# Patient Record
Sex: MALE | Race: BLACK OR AFRICAN AMERICAN | Employment: FULL TIME | URBAN - METROPOLITAN AREA
[De-identification: names, ages, dates, MRNs, and addresses within clinical notes are randomized per-mention and may not be internally consistent; named-entity substitution may affect disease eponyms.]

---

## 2017-05-20 PROBLEM — R73.9 HYPERGLYCEMIA: Status: ACTIVE | Noted: 2017-05-20

## 2018-06-12 ENCOUNTER — HOSPITAL ENCOUNTER (EMERGENCY)
Age: 53
Discharge: HOME | End: 2018-06-12
Attending: EMERGENCY MEDICINE

## 2018-06-12 VITALS
HEIGHT: 68 IN | WEIGHT: 235 LBS | OXYGEN SATURATION: 97 % | SYSTOLIC BLOOD PRESSURE: 136 MMHG | TEMPERATURE: 97.9 F | HEART RATE: 90 BPM | RESPIRATION RATE: 14 BRPM | DIASTOLIC BLOOD PRESSURE: 88 MMHG | BODY MASS INDEX: 35.61 KG/M2

## 2018-06-12 DIAGNOSIS — Z91.199 NONCOMPLIANCE: ICD-10-CM

## 2018-06-12 LAB
ALBUMIN SERPL-MCNC: 4.3 G/DL (ref 3.5–5.2)
ALP BLD-CCNC: 107 U/L (ref 40–129)
ALT SERPL-CCNC: 33 U/L (ref 0–40)
ANION GAP SERPL CALCULATED.3IONS-SCNC: 14 MMOL/L (ref 7–16)
ANION GAP SERPL CALCULATED.3IONS-SCNC: 21 MMOL/L (ref 7–16)
AST SERPL-CCNC: 22 U/L (ref 0–39)
BACTERIA: ABNORMAL /HPF
BASOPHILS ABSOLUTE: 0.02 E9/L (ref 0–0.2)
BASOPHILS RELATIVE PERCENT: 0.2 % (ref 0–2)
BETA-HYDROXYBUTYRATE: >4.5 MMOL/L (ref 0.02–0.27)
BILIRUB SERPL-MCNC: 0.3 MG/DL (ref 0–1.2)
BILIRUBIN URINE: NEGATIVE
BLOOD, URINE: ABNORMAL
BUN BLDV-MCNC: 20 MG/DL (ref 6–20)
BUN BLDV-MCNC: 23 MG/DL (ref 6–20)
CALCIUM SERPL-MCNC: 10 MG/DL (ref 8.6–10.2)
CALCIUM SERPL-MCNC: 8.7 MG/DL (ref 8.6–10.2)
CHLORIDE BLD-SCNC: 86 MMOL/L (ref 98–107)
CHLORIDE BLD-SCNC: 97 MMOL/L (ref 98–107)
CHP ED QC CHECK: NORMAL
CLARITY: CLEAR
CO2: 21 MMOL/L (ref 22–29)
CO2: 24 MMOL/L (ref 22–29)
COLOR: YELLOW
CREAT SERPL-MCNC: 1.3 MG/DL (ref 0.7–1.2)
CREAT SERPL-MCNC: 1.3 MG/DL (ref 0.7–1.2)
EKG ATRIAL RATE: 99 BPM
EKG P AXIS: 36 DEGREES
EKG P-R INTERVAL: 146 MS
EKG Q-T INTERVAL: 338 MS
EKG QRS DURATION: 94 MS
EKG QTC CALCULATION (BAZETT): 433 MS
EKG R AXIS: 45 DEGREES
EKG T AXIS: 7 DEGREES
EKG VENTRICULAR RATE: 99 BPM
EOSINOPHILS ABSOLUTE: 0.06 E9/L (ref 0.05–0.5)
EOSINOPHILS RELATIVE PERCENT: 0.6 % (ref 0–6)
GFR AFRICAN AMERICAN: >60
GFR AFRICAN AMERICAN: >60
GFR NON-AFRICAN AMERICAN: >60 ML/MIN/1.73
GFR NON-AFRICAN AMERICAN: >60 ML/MIN/1.73
GLUCOSE BLD-MCNC: 332 MG/DL (ref 74–109)
GLUCOSE BLD-MCNC: 481 MG/DL
GLUCOSE BLD-MCNC: 519 MG/DL (ref 74–109)
GLUCOSE URINE: >=1000 MG/DL
HCT VFR BLD CALC: 45.7 % (ref 37–54)
HEMOGLOBIN: 15.3 G/DL (ref 12.5–16.5)
IMMATURE GRANULOCYTES #: 0.05 E9/L
IMMATURE GRANULOCYTES %: 0.5 % (ref 0–5)
KETONES, URINE: >=80 MG/DL
LACTIC ACID: 1.7 MMOL/L (ref 0.5–2.2)
LEUKOCYTE ESTERASE, URINE: NEGATIVE
LYMPHOCYTES ABSOLUTE: 2.14 E9/L (ref 1.5–4)
LYMPHOCYTES RELATIVE PERCENT: 23 % (ref 20–42)
MCH RBC QN AUTO: 26.7 PG (ref 26–35)
MCHC RBC AUTO-ENTMCNC: 33.5 % (ref 32–34.5)
MCV RBC AUTO: 79.6 FL (ref 80–99.9)
METER GLUCOSE: 349 MG/DL (ref 70–110)
METER GLUCOSE: 481 MG/DL (ref 70–110)
MONOCYTES ABSOLUTE: 0.56 E9/L (ref 0.1–0.95)
MONOCYTES RELATIVE PERCENT: 6 % (ref 2–12)
NEUTROPHILS ABSOLUTE: 6.48 E9/L (ref 1.8–7.3)
NEUTROPHILS RELATIVE PERCENT: 69.7 % (ref 43–80)
NITRITE, URINE: NEGATIVE
PDW BLD-RTO: 12.8 FL (ref 11.5–15)
PH UA: 5 (ref 5–9)
PH VENOUS: 7.38 (ref 7.3–7.42)
PLATELET # BLD: 236 E9/L (ref 130–450)
PMV BLD AUTO: 10.6 FL (ref 7–12)
POTASSIUM SERPL-SCNC: 4.5 MMOL/L (ref 3.5–5)
POTASSIUM SERPL-SCNC: 5.3 MMOL/L (ref 3.5–5)
PROTEIN UA: ABNORMAL MG/DL
RBC # BLD: 5.74 E12/L (ref 3.8–5.8)
RBC UA: ABNORMAL /HPF (ref 0–2)
SODIUM BLD-SCNC: 128 MMOL/L (ref 132–146)
SODIUM BLD-SCNC: 135 MMOL/L (ref 132–146)
SPECIFIC GRAVITY UA: 1.01 (ref 1–1.03)
TOTAL PROTEIN: 8.6 G/DL (ref 6.4–8.3)
UROBILINOGEN, URINE: 0.2 E.U./DL
WBC # BLD: 9.3 E9/L (ref 4.5–11.5)
WBC UA: ABNORMAL /HPF (ref 0–5)

## 2018-06-12 PROCEDURE — 99285 EMERGENCY DEPT VISIT HI MDM: CPT

## 2018-06-12 PROCEDURE — 80048 BASIC METABOLIC PNL TOTAL CA: CPT

## 2018-06-12 PROCEDURE — 80053 COMPREHEN METABOLIC PANEL: CPT

## 2018-06-12 PROCEDURE — 82800 BLOOD PH: CPT

## 2018-06-12 PROCEDURE — 96361 HYDRATE IV INFUSION ADD-ON: CPT

## 2018-06-12 PROCEDURE — 81001 URINALYSIS AUTO W/SCOPE: CPT

## 2018-06-12 PROCEDURE — 96372 THER/PROPH/DIAG INJ SC/IM: CPT

## 2018-06-12 PROCEDURE — 93005 ELECTROCARDIOGRAM TRACING: CPT | Performed by: STUDENT IN AN ORGANIZED HEALTH CARE EDUCATION/TRAINING PROGRAM

## 2018-06-12 PROCEDURE — 36415 COLL VENOUS BLD VENIPUNCTURE: CPT

## 2018-06-12 PROCEDURE — 82010 KETONE BODYS QUAN: CPT

## 2018-06-12 PROCEDURE — 96360 HYDRATION IV INFUSION INIT: CPT

## 2018-06-12 PROCEDURE — 2580000003 HC RX 258: Performed by: STUDENT IN AN ORGANIZED HEALTH CARE EDUCATION/TRAINING PROGRAM

## 2018-06-12 PROCEDURE — 85025 COMPLETE CBC W/AUTO DIFF WBC: CPT

## 2018-06-12 PROCEDURE — 82962 GLUCOSE BLOOD TEST: CPT

## 2018-06-12 PROCEDURE — 6370000000 HC RX 637 (ALT 250 FOR IP): Performed by: STUDENT IN AN ORGANIZED HEALTH CARE EDUCATION/TRAINING PROGRAM

## 2018-06-12 PROCEDURE — 83605 ASSAY OF LACTIC ACID: CPT

## 2018-06-12 RX ORDER — 0.9 % SODIUM CHLORIDE 0.9 %
1000 INTRAVENOUS SOLUTION INTRAVENOUS ONCE
Status: COMPLETED | OUTPATIENT
Start: 2018-06-12 | End: 2018-06-12

## 2018-06-12 RX ORDER — INSULIN GLARGINE 100 [IU]/ML
30 INJECTION, SOLUTION SUBCUTANEOUS DAILY
Qty: 1 VIAL | Refills: 0 | Status: SHIPPED | OUTPATIENT
Start: 2018-06-12 | End: 2018-06-19 | Stop reason: SDUPTHER

## 2018-06-12 RX ORDER — LISINOPRIL AND HYDROCHLOROTHIAZIDE 25; 20 MG/1; MG/1
1 TABLET ORAL DAILY
COMMUNITY
End: 2018-06-12 | Stop reason: SDUPTHER

## 2018-06-12 RX ORDER — LISINOPRIL AND HYDROCHLOROTHIAZIDE 25; 20 MG/1; MG/1
1 TABLET ORAL DAILY
Qty: 30 TABLET | Refills: 0 | Status: SHIPPED | OUTPATIENT
Start: 2018-06-12 | End: 2018-06-19 | Stop reason: SDUPTHER

## 2018-06-12 RX ADMIN — INSULIN HUMAN 5 UNITS: 100 INJECTION, SOLUTION PARENTERAL at 10:02

## 2018-06-12 RX ADMIN — SODIUM CHLORIDE 1000 ML: 9 INJECTION, SOLUTION INTRAVENOUS at 09:08

## 2018-06-12 RX ADMIN — SODIUM CHLORIDE 1000 ML: 9 INJECTION, SOLUTION INTRAVENOUS at 11:37

## 2018-06-12 ASSESSMENT — ENCOUNTER SYMPTOMS
DIARRHEA: 0
BLOOD IN STOOL: 0
BACK PAIN: 0
SHORTNESS OF BREATH: 0
SORE THROAT: 0
CHEST TIGHTNESS: 0
NAUSEA: 0
CONSTIPATION: 0
ABDOMINAL PAIN: 0
COLOR CHANGE: 0
COUGH: 0

## 2018-06-19 ENCOUNTER — OFFICE VISIT (OUTPATIENT)
Dept: INTERNAL MEDICINE | Age: 53
End: 2018-06-19

## 2018-06-19 VITALS
WEIGHT: 210.3 LBS | DIASTOLIC BLOOD PRESSURE: 80 MMHG | HEART RATE: 112 BPM | SYSTOLIC BLOOD PRESSURE: 130 MMHG | BODY MASS INDEX: 31.87 KG/M2 | TEMPERATURE: 97.4 F | RESPIRATION RATE: 18 BRPM | HEIGHT: 68 IN

## 2018-06-19 DIAGNOSIS — Z79.4 TYPE 2 DIABETES MELLITUS WITHOUT COMPLICATION, WITH LONG-TERM CURRENT USE OF INSULIN (HCC): Primary | ICD-10-CM

## 2018-06-19 DIAGNOSIS — E11.9 TYPE 2 DIABETES MELLITUS WITHOUT COMPLICATION, WITH LONG-TERM CURRENT USE OF INSULIN (HCC): Primary | ICD-10-CM

## 2018-06-19 LAB — HBA1C MFR BLD: NORMAL %

## 2018-06-19 PROCEDURE — 99213 OFFICE O/P EST LOW 20 MIN: CPT | Performed by: INTERNAL MEDICINE

## 2018-06-19 PROCEDURE — 83036 HEMOGLOBIN GLYCOSYLATED A1C: CPT | Performed by: INTERNAL MEDICINE

## 2018-06-19 RX ORDER — LANCETS 33 GAUGE
EACH MISCELLANEOUS
Qty: 100 EACH | Refills: 5 | Status: SHIPPED | OUTPATIENT
Start: 2018-06-19 | End: 2018-06-20 | Stop reason: CLARIF

## 2018-06-19 RX ORDER — BLOOD-GLUCOSE METER
EACH MISCELLANEOUS
Qty: 1 DEVICE | Refills: 0 | Status: SHIPPED | OUTPATIENT
Start: 2018-06-19 | End: 2018-06-20 | Stop reason: CLARIF

## 2018-06-19 RX ORDER — LISINOPRIL AND HYDROCHLOROTHIAZIDE 25; 20 MG/1; MG/1
1 TABLET ORAL DAILY
Qty: 90 TABLET | Refills: 1 | Status: SHIPPED | OUTPATIENT
Start: 2018-06-19

## 2018-06-19 RX ORDER — INSULIN GLARGINE 100 [IU]/ML
30 INJECTION, SOLUTION SUBCUTANEOUS DAILY
Qty: 1 VIAL | Refills: 3 | Status: SHIPPED
Start: 2018-06-19 | End: 2021-06-08 | Stop reason: ALTCHOICE

## 2018-06-19 RX ORDER — ATORVASTATIN CALCIUM 10 MG/1
10 TABLET, FILM COATED ORAL DAILY
Qty: 90 TABLET | Refills: 1 | Status: SHIPPED
Start: 2018-06-19 | End: 2021-06-08

## 2018-06-19 ASSESSMENT — ENCOUNTER SYMPTOMS
VOMITING: 0
SHORTNESS OF BREATH: 0
NAUSEA: 0
BACK PAIN: 0
ABDOMINAL PAIN: 0
DIARRHEA: 0
WHEEZING: 0
SORE THROAT: 0

## 2018-06-19 ASSESSMENT — PATIENT HEALTH QUESTIONNAIRE - PHQ9
1. LITTLE INTEREST OR PLEASURE IN DOING THINGS: 0
SUM OF ALL RESPONSES TO PHQ QUESTIONS 1-9: 0
SUM OF ALL RESPONSES TO PHQ9 QUESTIONS 1 & 2: 0
2. FEELING DOWN, DEPRESSED OR HOPELESS: 0

## 2018-06-20 RX ORDER — BLOOD-GLUCOSE METER
EACH MISCELLANEOUS
Qty: 1 DEVICE | Refills: 0 | Status: SHIPPED
Start: 2018-06-20 | End: 2021-06-08 | Stop reason: ALTCHOICE

## 2018-06-20 RX ORDER — LANCETS 33 GAUGE
EACH MISCELLANEOUS
Qty: 100 EACH | Refills: 5 | Status: SHIPPED
Start: 2018-06-20 | End: 2021-06-08 | Stop reason: ALTCHOICE

## 2018-07-10 ENCOUNTER — HOSPITAL ENCOUNTER (OUTPATIENT)
Dept: DIABETES SERVICES | Age: 53
Setting detail: THERAPIES SERIES
Discharge: HOME | End: 2018-07-10

## 2018-07-10 PROCEDURE — G0108 DIAB MANAGE TRN  PER INDIV: HCPCS

## 2018-07-10 PROCEDURE — G0109 DIAB MANAGE TRN IND/GROUP: HCPCS

## 2018-07-10 ASSESSMENT — PATIENT HEALTH QUESTIONNAIRE - PHQ9
2. FEELING DOWN, DEPRESSED OR HOPELESS: 0
7. TROUBLE CONCENTRATING ON THINGS, SUCH AS READING THE NEWSPAPER OR WATCHING TELEVISION: 0
8. MOVING OR SPEAKING SO SLOWLY THAT OTHER PEOPLE COULD HAVE NOTICED. OR THE OPPOSITE, BEING SO FIGETY OR RESTLESS THAT YOU HAVE BEEN MOVING AROUND A LOT MORE THAN USUAL: 0
9. THOUGHTS THAT YOU WOULD BE BETTER OFF DEAD, OR OF HURTING YOURSELF: 0
3. TROUBLE FALLING OR STAYING ASLEEP: 1
4. FEELING TIRED OR HAVING LITTLE ENERGY: 1
5. POOR APPETITE OR OVEREATING: 0
10. IF YOU CHECKED OFF ANY PROBLEMS, HOW DIFFICULT HAVE THESE PROBLEMS MADE IT FOR YOU TO DO YOUR WORK, TAKE CARE OF THINGS AT HOME, OR GET ALONG WITH OTHER PEOPLE: 0
SUM OF ALL RESPONSES TO PHQ9 QUESTIONS 1 & 2: 0
SUM OF ALL RESPONSES TO PHQ QUESTIONS 1-9: 2
6. FEELING BAD ABOUT YOURSELF - OR THAT YOU ARE A FAILURE OR HAVE LET YOURSELF OR YOUR FAMILY DOWN: 0
1. LITTLE INTEREST OR PLEASURE IN DOING THINGS: 0

## 2018-07-10 NOTE — LETTER
[facility])- Diabetes Education    7/10/2018       Re:     Keith Huffman         :  1965  Dear Dr. Carly Enciso: Thank you for referring your patient, Keith Huffman, for diabetes education sessions. Your patient attended classes the week of 7/10/18, that addressed the following topics:    Nursing/Medical [x]     Nutrition [x]  · Describing the diabetes disease process/ treatment options  · Incorporating physical activity into lifestyle  · Using medication safely & for maximum therapeutic effectiveness  · Monitoring blood glucose & other parameters:  Interpreting and using results for self-management & decision making  · Preventing, detecting, & treating acute complications  · Preventing, detecting & treating chronic complications  · Developing personal strategies to address psychosocial issues and concerns  · Developing personal strategies to promote health & behavior change (risk reduction) · Incorporating nutrition management into lifestyle    · Nutrition for diabetes prevention & diabetes  · Relationship among nutrition, exercise, medication & blood glucose levels  · Food Groups/carbohydrate & non- carbohydrate foods  · Whole grain/high fiber foods & needs  · Fluid needs   · Label reading  · Carbohydrate consistent meal planning/ techniques  · Weighing/measuring portions  · Heart healthy guidelines: fat, sodium & cholesterol  · Alcohol  · Free foods/nutritive and non-nutritive sweeteners  · Dining out tips and recipe modification guidelines  · Sick day guidelines     [x]  Instructed on carbohydrate consistent meal plan with  1900 calories, and the following number of carbohydrate servings per meal or snack (15 gm/serving):   Breakfast: 5  Lunch: 3  Dinner: 5    [x]  Did well with return demonstration on an Agamatrix Presto glucose meter. Blood glucose was 465 mg/dl. []  Did well with return demonstration of insulin technique with        .

## 2018-07-11 ENCOUNTER — HOSPITAL ENCOUNTER (OUTPATIENT)
Dept: DIABETES SERVICES | Age: 53
Setting detail: THERAPIES SERIES
Discharge: HOME | End: 2018-07-11

## 2018-07-11 PROCEDURE — G0109 DIAB MANAGE TRN IND/GROUP: HCPCS

## 2018-07-12 ENCOUNTER — HOSPITAL ENCOUNTER (OUTPATIENT)
Dept: DIABETES SERVICES | Age: 53
Setting detail: THERAPIES SERIES
Discharge: HOME | End: 2018-07-12

## 2018-07-12 PROCEDURE — 97804 MEDICAL NUTRITION GROUP: CPT

## 2018-10-31 ENCOUNTER — TELEPHONE (OUTPATIENT)
Dept: INTERNAL MEDICINE | Age: 53
End: 2018-10-31

## 2019-04-24 ENCOUNTER — TELEPHONE (OUTPATIENT)
Dept: INTERNAL MEDICINE | Age: 54
End: 2019-04-24

## 2019-04-24 NOTE — TELEPHONE ENCOUNTER
Reached out to patient due to being on the non-adherent list and to schedule an appt. Patient has not been seen in the clinic since June 2018. Unable to reached patient, left a voicemail.

## 2019-05-07 ENCOUNTER — HOSPITAL ENCOUNTER (EMERGENCY)
Age: 54
Discharge: HOME | End: 2019-05-07
Attending: EMERGENCY MEDICINE

## 2019-05-07 VITALS
SYSTOLIC BLOOD PRESSURE: 105 MMHG | DIASTOLIC BLOOD PRESSURE: 70 MMHG | OXYGEN SATURATION: 99 % | HEIGHT: 68 IN | HEART RATE: 96 BPM | TEMPERATURE: 96.8 F | RESPIRATION RATE: 16 BRPM | WEIGHT: 197 LBS | BODY MASS INDEX: 29.86 KG/M2

## 2019-05-07 DIAGNOSIS — E11.8 TYPE 2 DIABETES MELLITUS WITH COMPLICATION, UNSPECIFIED WHETHER LONG TERM INSULIN USE: Primary | ICD-10-CM

## 2019-05-07 DIAGNOSIS — E11.42 DIABETIC POLYNEUROPATHY ASSOCIATED WITH TYPE 2 DIABETES MELLITUS (HCC): ICD-10-CM

## 2019-05-07 LAB
ALBUMIN SERPL-MCNC: 3.9 G/DL (ref 3.5–5.2)
ALP BLD-CCNC: 120 U/L (ref 40–129)
ALT SERPL-CCNC: 12 U/L (ref 0–40)
ANION GAP SERPL CALCULATED.3IONS-SCNC: 12 MMOL/L (ref 7–16)
AST SERPL-CCNC: 26 U/L (ref 0–39)
BASOPHILS ABSOLUTE: 0.02 E9/L (ref 0–0.2)
BASOPHILS RELATIVE PERCENT: 0.3 % (ref 0–2)
BETA-HYDROXYBUTYRATE: 0.87 MMOL/L (ref 0.02–0.27)
BILIRUB SERPL-MCNC: 0.2 MG/DL (ref 0–1.2)
BILIRUBIN URINE: NEGATIVE
BLOOD, URINE: NEGATIVE
BUN BLDV-MCNC: 38 MG/DL (ref 6–20)
CALCIUM SERPL-MCNC: 9.7 MG/DL (ref 8.6–10.2)
CHLORIDE BLD-SCNC: 89 MMOL/L (ref 98–107)
CHP ED QC CHECK: YES
CLARITY: CLEAR
CO2: 25 MMOL/L (ref 22–29)
COLOR: YELLOW
CREAT SERPL-MCNC: 1.7 MG/DL (ref 0.7–1.2)
EKG ATRIAL RATE: 100 BPM
EKG P AXIS: 59 DEGREES
EKG P-R INTERVAL: 154 MS
EKG Q-T INTERVAL: 342 MS
EKG QRS DURATION: 98 MS
EKG QTC CALCULATION (BAZETT): 441 MS
EKG R AXIS: 82 DEGREES
EKG T AXIS: 39 DEGREES
EKG VENTRICULAR RATE: 100 BPM
EOSINOPHILS ABSOLUTE: 0.11 E9/L (ref 0.05–0.5)
EOSINOPHILS RELATIVE PERCENT: 1.5 % (ref 0–6)
GFR AFRICAN AMERICAN: 51
GFR NON-AFRICAN AMERICAN: 51 ML/MIN/1.73
GLUCOSE BLD-MCNC: 364 MG/DL
GLUCOSE BLD-MCNC: 528 MG/DL (ref 74–99)
GLUCOSE URINE: >=1000 MG/DL
HCT VFR BLD CALC: 40.4 % (ref 37–54)
HEMOGLOBIN: 13.8 G/DL (ref 12.5–16.5)
IMMATURE GRANULOCYTES #: 0.02 E9/L
IMMATURE GRANULOCYTES %: 0.3 % (ref 0–5)
KETONES, URINE: ABNORMAL MG/DL
LACTIC ACID: 1.1 MMOL/L (ref 0.5–2.2)
LEUKOCYTE ESTERASE, URINE: NEGATIVE
LYMPHOCYTES ABSOLUTE: 2.38 E9/L (ref 1.5–4)
LYMPHOCYTES RELATIVE PERCENT: 31.7 % (ref 20–42)
MCH RBC QN AUTO: 26.3 PG (ref 26–35)
MCHC RBC AUTO-ENTMCNC: 34.2 % (ref 32–34.5)
MCV RBC AUTO: 77.1 FL (ref 80–99.9)
METER GLUCOSE: 364 MG/DL (ref 74–99)
METER GLUCOSE: 480 MG/DL (ref 74–99)
MONOCYTES ABSOLUTE: 0.59 E9/L (ref 0.1–0.95)
MONOCYTES RELATIVE PERCENT: 7.9 % (ref 2–12)
NEUTROPHILS ABSOLUTE: 4.39 E9/L (ref 1.8–7.3)
NEUTROPHILS RELATIVE PERCENT: 58.3 % (ref 43–80)
NITRITE, URINE: NEGATIVE
PDW BLD-RTO: 12.6 FL (ref 11.5–15)
PH UA: 5.5 (ref 5–9)
PH VENOUS: 7.39 (ref 7.3–7.42)
PLATELET # BLD: 220 E9/L (ref 130–450)
PMV BLD AUTO: 10.1 FL (ref 7–12)
POTASSIUM SERPL-SCNC: 5 MMOL/L (ref 3.5–5)
PROTEIN UA: NEGATIVE MG/DL
RBC # BLD: 5.24 E12/L (ref 3.8–5.8)
SODIUM BLD-SCNC: 126 MMOL/L (ref 132–146)
SPECIFIC GRAVITY UA: 1.01 (ref 1–1.03)
TOTAL PROTEIN: 8 G/DL (ref 6.4–8.3)
TROPONIN: <0.01 NG/ML (ref 0–0.03)
UROBILINOGEN, URINE: 0.2 E.U./DL
WBC # BLD: 7.5 E9/L (ref 4.5–11.5)

## 2019-05-07 PROCEDURE — 99284 EMERGENCY DEPT VISIT MOD MDM: CPT

## 2019-05-07 PROCEDURE — 96372 THER/PROPH/DIAG INJ SC/IM: CPT

## 2019-05-07 PROCEDURE — 83605 ASSAY OF LACTIC ACID: CPT

## 2019-05-07 PROCEDURE — 82800 BLOOD PH: CPT

## 2019-05-07 PROCEDURE — 36415 COLL VENOUS BLD VENIPUNCTURE: CPT

## 2019-05-07 PROCEDURE — 81003 URINALYSIS AUTO W/O SCOPE: CPT

## 2019-05-07 PROCEDURE — 93005 ELECTROCARDIOGRAM TRACING: CPT | Performed by: PHYSICIAN ASSISTANT

## 2019-05-07 PROCEDURE — 85025 COMPLETE CBC W/AUTO DIFF WBC: CPT

## 2019-05-07 PROCEDURE — 82962 GLUCOSE BLOOD TEST: CPT

## 2019-05-07 PROCEDURE — 80053 COMPREHEN METABOLIC PANEL: CPT

## 2019-05-07 PROCEDURE — 93010 ELECTROCARDIOGRAM REPORT: CPT | Performed by: INTERNAL MEDICINE

## 2019-05-07 PROCEDURE — 84484 ASSAY OF TROPONIN QUANT: CPT

## 2019-05-07 PROCEDURE — 82010 KETONE BODYS QUAN: CPT

## 2019-05-07 PROCEDURE — 6370000000 HC RX 637 (ALT 250 FOR IP): Performed by: EMERGENCY MEDICINE

## 2019-05-07 PROCEDURE — 2580000003 HC RX 258: Performed by: PHYSICIAN ASSISTANT

## 2019-05-07 RX ORDER — 0.9 % SODIUM CHLORIDE 0.9 %
1000 INTRAVENOUS SOLUTION INTRAVENOUS ONCE
Status: COMPLETED | OUTPATIENT
Start: 2019-05-07 | End: 2019-05-07

## 2019-05-07 RX ADMIN — SODIUM CHLORIDE 1000 ML: 9 INJECTION, SOLUTION INTRAVENOUS at 10:50

## 2019-05-07 RX ADMIN — INSULIN HUMAN 10 UNITS: 100 INJECTION, SOLUTION PARENTERAL at 10:54

## 2019-05-07 RX ADMIN — SODIUM CHLORIDE 1000 ML: 9 INJECTION, SOLUTION INTRAVENOUS at 08:30

## 2019-05-07 NOTE — ED PROVIDER NOTES
ROM, non tender to palpation in the midline, no stridor, no crepitus, no meningeal signs  · Respiratory: Lungs clear to auscultation bilaterally, no wheezes, rales, or rhonchi. Not in respiratory distress  · CV:  Regular rate. Regular rhythm. No murmurs, gallops, or rubs. 2+ distal pulses  · GI:  Abdomen Soft, Non tender, Non distended. +BS. No rebound, guarding, or rigidity. No pulsatile masses. · Musculoskeletal: Moves all extremities x 4. Warm and well perfused, no clubbing, cyanosis, or edema. Capillary refill <3 seconds  · Integument: skin warm and dry. No rashes.    · Lymphatic: no lymphadenopathy noted  · Neurologic: GCS 15, no focal deficits, symmetric strength 5/5 in the upper and lower extremities bilaterally  · Psychiatric: Normal Affect      Lab / Imaging Results   (All laboratory and radiology results have been personally reviewed by myself)  Labs:  Results for orders placed or performed during the hospital encounter of 05/07/19   CBC Auto Differential   Result Value Ref Range    WBC 7.5 4.5 - 11.5 E9/L    RBC 5.24 3.80 - 5.80 E12/L    Hemoglobin 13.8 12.5 - 16.5 g/dL    Hematocrit 40.4 37.0 - 54.0 %    MCV 77.1 (L) 80.0 - 99.9 fL    MCH 26.3 26.0 - 35.0 pg    MCHC 34.2 32.0 - 34.5 %    RDW 12.6 11.5 - 15.0 fL    Platelets 444 850 - 747 E9/L    MPV 10.1 7.0 - 12.0 fL    Neutrophils % 58.3 43.0 - 80.0 %    Immature Granulocytes % 0.3 0.0 - 5.0 %    Lymphocytes % 31.7 20.0 - 42.0 %    Monocytes % 7.9 2.0 - 12.0 %    Eosinophils % 1.5 0.0 - 6.0 %    Basophils % 0.3 0.0 - 2.0 %    Neutrophils # 4.39 1.80 - 7.30 E9/L    Immature Granulocytes # 0.02 E9/L    Lymphocytes # 2.38 1.50 - 4.00 E9/L    Monocytes # 0.59 0.10 - 0.95 E9/L    Eosinophils # 0.11 0.05 - 0.50 E9/L    Basophils # 0.02 0.00 - 0.20 E9/L   Comprehensive Metabolic Panel   Result Value Ref Range    Sodium 126 (L) 132 - 146 mmol/L    Potassium 5.0 3.5 - 5.0 mmol/L    Chloride 89 (L) 98 - 107 mmol/L    CO2 25 22 - 29 mmol/L    Anion Gap 12 7 - 16 mmol/L    Glucose 528 (HH) 74 - 99 mg/dL    BUN 38 (H) 6 - 20 mg/dL    CREATININE 1.7 (H) 0.7 - 1.2 mg/dL    GFR Non-African American 51 >=60 mL/min/1.73    GFR African American 51     Calcium 9.7 8.6 - 10.2 mg/dL    Total Protein 8.0 6.4 - 8.3 g/dL    Alb 3.9 3.5 - 5.2 g/dL    Total Bilirubin 0.2 0.0 - 1.2 mg/dL    Alkaline Phosphatase 120 40 - 129 U/L    ALT 12 0 - 40 U/L    AST 26 0 - 39 U/L   Lactic Acid, Plasma   Result Value Ref Range    Lactic Acid 1.1 0.5 - 2.2 mmol/L   Urinalysis   Result Value Ref Range    Color, UA Yellow Straw/Yellow    Clarity, UA Clear Clear    Glucose, Ur >=1000 (A) Negative mg/dL    Bilirubin Urine Negative Negative    Ketones, Urine TRACE (A) Negative mg/dL    Specific Gravity, UA 1.010 1.005 - 1.030    Blood, Urine Negative Negative    pH, UA 5.5 5.0 - 9.0    Protein, UA Negative Negative mg/dL    Urobilinogen, Urine 0.2 <2.0 E.U./dL    Nitrite, Urine Negative Negative    Leukocyte Esterase, Urine Negative Negative   Beta-Hydroxybutyrate   Result Value Ref Range    Beta-Hydroxybutyrate 0.87 (H) 0.02 - 0.27 mmol/L   pH, venous   Result Value Ref Range    pH, Darwin 7.39 7.30 - 7.42   Troponin   Result Value Ref Range    Troponin <0.01 0.00 - 0.03 ng/mL   POCT Glucose   Result Value Ref Range    Meter Glucose 480 (H) 74 - 99 mg/dL   POCT Glucose   Result Value Ref Range    Glucose 364 mg/dL    QC OK? yes    POCT Glucose   Result Value Ref Range    Meter Glucose 364 (H) 74 - 99 mg/dL   EKG 12 Lead   Result Value Ref Range    Ventricular Rate 100 BPM    Atrial Rate 100 BPM    P-R Interval 154 ms    QRS Duration 98 ms    Q-T Interval 342 ms    QTc Calculation (Bazett) 441 ms    P Axis 59 degrees    R Axis 82 degrees    T Axis 39 degrees     Imaging: All Radiology results interpreted by Radiologist unless otherwise noted. No orders to display     EKG #1:  Interpreted by emergency department physician unless otherwise noted. Time:  0830    Rate: 100  Rhythm: Sinus.   Interpretation: normal sinus rhythm, nonspecific ST and T waves changes, unchanged from previous tracings. ED Course / Medical Decision Making     Medications   0.9 % sodium chloride bolus (0 mLs Intravenous Stopped 5/7/19 1038)   0.9 % sodium chloride bolus (0 mLs Intravenous Stopped 5/7/19 1205)   insulin regular (HUMULIN R;NOVOLIN R) injection 10 Units (10 Units Subcutaneous Given 5/7/19 1054)     Consultations:             None    Procedures:   none    MDM:  Patient in no acute distress. Vital signs stable. Initial glucose 480. Lab work obtained. Hydrate with IV fluid. Medicated with subcu insulin. Blood sugar improved. No signs of DKA. Troponin negative. EKG shows no acute changes. No focal neurologic deficits. Some decrease in sensation to the tips of the fingers. Dr. Magnus Mendez evaluated the patient. Patient will be started on metformin. Patient will follow with the internal medicine clinic. Educated on the signs and symptoms to return to the ED. Discharged in stable condition. Counseling:   I have spoken with the patient and discussed todays results, in addition to providing specific details for the plan of care and counseling regarding the diagnosis and prognosis and are agreeable with the plan. Assessment      1. Type 2 diabetes mellitus with complication, unspecified whether long term insulin use ([facility] [address]    2. Diabetic polyneuropathy associated with type 2 diabetes mellitus ([facility] [address]      This patient's ED course included: a personal history and physicial examination  This patient has remained hemodynamically stable during their ED course. Plan   Discharge to home. Patient condition is good.     New Medications     Discharge Medication List as of 5/7/2019 10:52 AM      START taking these medications    Details   metFORMIN (GLUCOPHAGE) 1000 MG tablet Take 1 tablet by mouth 2 times daily (with meals) for 14 days, Disp-28 tablet, R-0Print           Electronically signed by BHARATI Montano   DD: 5/7/19  **This

## 2021-06-08 ENCOUNTER — APPOINTMENT (OUTPATIENT)
Dept: CT IMAGING | Age: 56
DRG: 641 | End: 2021-06-08

## 2021-06-08 ENCOUNTER — HOSPITAL ENCOUNTER (INPATIENT)
Age: 56
LOS: 1 days | Discharge: HOME | DRG: 641 | End: 2021-06-09
Attending: EMERGENCY MEDICINE | Admitting: INTERNAL MEDICINE

## 2021-06-08 DIAGNOSIS — E86.0 DEHYDRATION: ICD-10-CM

## 2021-06-08 DIAGNOSIS — R77.8 ELEVATED TROPONIN: ICD-10-CM

## 2021-06-08 DIAGNOSIS — R73.9 HYPERGLYCEMIA: ICD-10-CM

## 2021-06-08 DIAGNOSIS — S39.012A STRAIN OF LUMBAR REGION, INITIAL ENCOUNTER: Primary | ICD-10-CM

## 2021-06-08 LAB
ANION GAP SERPL CALCULATED.3IONS-SCNC: 12 MMOL/L (ref 7–16)
BACTERIA: NORMAL /HPF
BASOPHILS ABSOLUTE: 0.04 E9/L (ref 0–0.2)
BASOPHILS RELATIVE PERCENT: 0.4 % (ref 0–2)
BETA-HYDROXYBUTYRATE: 0.14 MMOL/L (ref 0.02–0.27)
BILIRUBIN URINE: NEGATIVE
BLOOD, URINE: ABNORMAL
BUN BLDV-MCNC: 29 MG/DL (ref 6–20)
CALCIUM SERPL-MCNC: 10.2 MG/DL (ref 8.6–10.2)
CHLORIDE BLD-SCNC: 92 MMOL/L (ref 98–107)
CK MB: <1 NG/ML (ref 0–7.7)
CLARITY: CLEAR
CO2: 25 MMOL/L (ref 22–29)
COLOR: YELLOW
CREAT SERPL-MCNC: 1.6 MG/DL (ref 0.7–1.2)
D DIMER: 453 NG/ML DDU
EKG ATRIAL RATE: 112 BPM
EKG P AXIS: 48 DEGREES
EKG P-R INTERVAL: 152 MS
EKG Q-T INTERVAL: 330 MS
EKG QRS DURATION: 86 MS
EKG QTC CALCULATION (BAZETT): 450 MS
EKG R AXIS: 71 DEGREES
EKG T AXIS: 32 DEGREES
EKG VENTRICULAR RATE: 112 BPM
EOSINOPHILS ABSOLUTE: 0.05 E9/L (ref 0.05–0.5)
EOSINOPHILS RELATIVE PERCENT: 0.6 % (ref 0–6)
GFR AFRICAN AMERICAN: 55
GFR NON-AFRICAN AMERICAN: 55 ML/MIN/1.73
GLUCOSE BLD-MCNC: 486 MG/DL (ref 74–99)
GLUCOSE URINE: >=1000 MG/DL
HCT VFR BLD CALC: 45.7 % (ref 37–54)
HEMOGLOBIN: 14.9 G/DL (ref 12.5–16.5)
IMMATURE GRANULOCYTES #: 0.06 E9/L
IMMATURE GRANULOCYTES %: 0.7 % (ref 0–5)
KETONES, URINE: NEGATIVE MG/DL
LACTIC ACID: 1.6 MMOL/L (ref 0.5–2.2)
LEUKOCYTE ESTERASE, URINE: NEGATIVE
LYMPHOCYTES ABSOLUTE: 2.23 E9/L (ref 1.5–4)
LYMPHOCYTES RELATIVE PERCENT: 24.9 % (ref 20–42)
MAGNESIUM: 2.2 MG/DL (ref 1.6–2.6)
MCH RBC QN AUTO: 26.7 PG (ref 26–35)
MCHC RBC AUTO-ENTMCNC: 32.6 % (ref 32–34.5)
MCV RBC AUTO: 81.9 FL (ref 80–99.9)
METER GLUCOSE: 337 MG/DL (ref 74–99)
METER GLUCOSE: 369 MG/DL (ref 74–99)
METER GLUCOSE: 383 MG/DL (ref 74–99)
MONOCYTES ABSOLUTE: 0.82 E9/L (ref 0.1–0.95)
MONOCYTES RELATIVE PERCENT: 9.2 % (ref 2–12)
NEUTROPHILS ABSOLUTE: 5.76 E9/L (ref 1.8–7.3)
NEUTROPHILS RELATIVE PERCENT: 64.2 % (ref 43–80)
NITRITE, URINE: NEGATIVE
PDW BLD-RTO: 13.5 FL (ref 11.5–15)
PH UA: 5.5 (ref 5–9)
PH VENOUS: 7.34 (ref 7.35–7.45)
PLATELET # BLD: 275 E9/L (ref 130–450)
PMV BLD AUTO: 9.8 FL (ref 7–12)
POTASSIUM REFLEX MAGNESIUM: 5 MMOL/L (ref 3.5–5)
PROTEIN UA: 30 MG/DL
RBC # BLD: 5.58 E12/L (ref 3.8–5.8)
RBC UA: NORMAL /HPF (ref 0–2)
SODIUM BLD-SCNC: 129 MMOL/L (ref 132–146)
SPECIFIC GRAVITY UA: 1.02 (ref 1–1.03)
TOTAL CK: 102 U/L (ref 20–200)
TROPONIN, HIGH SENSITIVITY: 23 NG/L (ref 0–11)
TROPONIN, HIGH SENSITIVITY: 28 NG/L (ref 0–11)
UROBILINOGEN, URINE: 0.2 E.U./DL
WBC # BLD: 9 E9/L (ref 4.5–11.5)
WBC UA: NORMAL /HPF (ref 0–5)

## 2021-06-08 PROCEDURE — 96360 HYDRATION IV INFUSION INIT: CPT

## 2021-06-08 PROCEDURE — 83735 ASSAY OF MAGNESIUM: CPT

## 2021-06-08 PROCEDURE — 2580000003 HC RX 258: Performed by: INTERNAL MEDICINE

## 2021-06-08 PROCEDURE — 82962 GLUCOSE BLOOD TEST: CPT

## 2021-06-08 PROCEDURE — 72131 CT LUMBAR SPINE W/O DYE: CPT

## 2021-06-08 PROCEDURE — 6360000002 HC RX W HCPCS: Performed by: STUDENT IN AN ORGANIZED HEALTH CARE EDUCATION/TRAINING PROGRAM

## 2021-06-08 PROCEDURE — 93010 ELECTROCARDIOGRAM REPORT: CPT | Performed by: INTERNAL MEDICINE

## 2021-06-08 PROCEDURE — 84484 ASSAY OF TROPONIN QUANT: CPT

## 2021-06-08 PROCEDURE — 85378 FIBRIN DEGRADE SEMIQUANT: CPT

## 2021-06-08 PROCEDURE — 6370000000 HC RX 637 (ALT 250 FOR IP): Performed by: STUDENT IN AN ORGANIZED HEALTH CARE EDUCATION/TRAINING PROGRAM

## 2021-06-08 PROCEDURE — 81001 URINALYSIS AUTO W/SCOPE: CPT

## 2021-06-08 PROCEDURE — 87088 URINE BACTERIA CULTURE: CPT

## 2021-06-08 PROCEDURE — 99285 EMERGENCY DEPT VISIT HI MDM: CPT

## 2021-06-08 PROCEDURE — 6370000000 HC RX 637 (ALT 250 FOR IP): Performed by: INTERNAL MEDICINE

## 2021-06-08 PROCEDURE — 82553 CREATINE MB FRACTION: CPT

## 2021-06-08 PROCEDURE — 99222 1ST HOSP IP/OBS MODERATE 55: CPT | Performed by: INTERNAL MEDICINE

## 2021-06-08 PROCEDURE — 93005 ELECTROCARDIOGRAM TRACING: CPT | Performed by: STUDENT IN AN ORGANIZED HEALTH CARE EDUCATION/TRAINING PROGRAM

## 2021-06-08 PROCEDURE — 2060000000 HC ICU INTERMEDIATE R&B

## 2021-06-08 PROCEDURE — 71275 CT ANGIOGRAPHY CHEST: CPT

## 2021-06-08 PROCEDURE — 83605 ASSAY OF LACTIC ACID: CPT

## 2021-06-08 PROCEDURE — 2580000003 HC RX 258: Performed by: STUDENT IN AN ORGANIZED HEALTH CARE EDUCATION/TRAINING PROGRAM

## 2021-06-08 PROCEDURE — 80048 BASIC METABOLIC PNL TOTAL CA: CPT

## 2021-06-08 PROCEDURE — 82800 BLOOD PH: CPT

## 2021-06-08 PROCEDURE — 82010 KETONE BODYS QUAN: CPT

## 2021-06-08 PROCEDURE — 82550 ASSAY OF CK (CPK): CPT

## 2021-06-08 PROCEDURE — 85025 COMPLETE CBC W/AUTO DIFF WBC: CPT

## 2021-06-08 PROCEDURE — 6360000004 HC RX CONTRAST MEDICATION: Performed by: RADIOLOGY

## 2021-06-08 RX ORDER — MORPHINE SULFATE 4 MG/ML
4 INJECTION, SOLUTION INTRAMUSCULAR; INTRAVENOUS ONCE
Status: COMPLETED | OUTPATIENT
Start: 2021-06-08 | End: 2021-06-08

## 2021-06-08 RX ORDER — ATORVASTATIN CALCIUM 40 MG/1
40 TABLET, FILM COATED ORAL NIGHTLY
Status: DISCONTINUED | OUTPATIENT
Start: 2021-06-08 | End: 2021-06-09 | Stop reason: HOSPADM

## 2021-06-08 RX ORDER — ACETAMINOPHEN 650 MG/1
650 SUPPOSITORY RECTAL EVERY 6 HOURS PRN
Status: DISCONTINUED | OUTPATIENT
Start: 2021-06-08 | End: 2021-06-09 | Stop reason: HOSPADM

## 2021-06-08 RX ORDER — POTASSIUM CHLORIDE 7.45 MG/ML
10 INJECTION INTRAVENOUS PRN
Status: DISCONTINUED | OUTPATIENT
Start: 2021-06-08 | End: 2021-06-09 | Stop reason: HOSPADM

## 2021-06-08 RX ORDER — ASPIRIN 81 MG/1
324 TABLET, CHEWABLE ORAL ONCE
Status: COMPLETED | OUTPATIENT
Start: 2021-06-08 | End: 2021-06-08

## 2021-06-08 RX ORDER — ATORVASTATIN CALCIUM 10 MG/1
10 TABLET, FILM COATED ORAL DAILY
Status: DISCONTINUED | OUTPATIENT
Start: 2021-06-09 | End: 2021-06-08 | Stop reason: DRUGHIGH

## 2021-06-08 RX ORDER — INSULIN GLARGINE 100 [IU]/ML
25 INJECTION, SOLUTION SUBCUTANEOUS NIGHTLY
Status: DISCONTINUED | OUTPATIENT
Start: 2021-06-08 | End: 2021-06-09 | Stop reason: HOSPADM

## 2021-06-08 RX ORDER — INSULIN DETEMIR 100 [IU]/ML
32 INJECTION, SOLUTION SUBCUTANEOUS NIGHTLY
COMMUNITY

## 2021-06-08 RX ORDER — POLYETHYLENE GLYCOL 3350 17 G/17G
17 POWDER, FOR SOLUTION ORAL DAILY PRN
Status: DISCONTINUED | OUTPATIENT
Start: 2021-06-08 | End: 2021-06-09 | Stop reason: HOSPADM

## 2021-06-08 RX ORDER — ONDANSETRON 2 MG/ML
4 INJECTION INTRAMUSCULAR; INTRAVENOUS EVERY 6 HOURS PRN
Status: DISCONTINUED | OUTPATIENT
Start: 2021-06-08 | End: 2021-06-09 | Stop reason: HOSPADM

## 2021-06-08 RX ORDER — ACETAMINOPHEN 325 MG/1
650 TABLET ORAL EVERY 6 HOURS PRN
Status: DISCONTINUED | OUTPATIENT
Start: 2021-06-08 | End: 2021-06-09 | Stop reason: HOSPADM

## 2021-06-08 RX ORDER — SODIUM CHLORIDE 0.9 % (FLUSH) 0.9 %
5-40 SYRINGE (ML) INJECTION PRN
Status: DISCONTINUED | OUTPATIENT
Start: 2021-06-08 | End: 2021-06-09 | Stop reason: HOSPADM

## 2021-06-08 RX ORDER — ASPIRIN 81 MG/1
81 TABLET, CHEWABLE ORAL DAILY
Status: DISCONTINUED | OUTPATIENT
Start: 2021-06-09 | End: 2021-06-09 | Stop reason: HOSPADM

## 2021-06-08 RX ORDER — POTASSIUM CHLORIDE 20 MEQ/1
40 TABLET, EXTENDED RELEASE ORAL PRN
Status: DISCONTINUED | OUTPATIENT
Start: 2021-06-08 | End: 2021-06-09 | Stop reason: HOSPADM

## 2021-06-08 RX ORDER — SODIUM CHLORIDE 450 MG/100ML
1000 INJECTION, SOLUTION INTRAVENOUS CONTINUOUS
Status: DISCONTINUED | OUTPATIENT
Start: 2021-06-08 | End: 2021-06-09

## 2021-06-08 RX ORDER — HYDROCODONE BITARTRATE AND ACETAMINOPHEN 7.5; 325 MG/1; MG/1
1 TABLET ORAL EVERY 6 HOURS PRN
Status: DISCONTINUED | OUTPATIENT
Start: 2021-06-08 | End: 2021-06-09 | Stop reason: HOSPADM

## 2021-06-08 RX ORDER — NITROGLYCERIN 0.4 MG/1
0.4 TABLET SUBLINGUAL EVERY 5 MIN PRN
Status: DISCONTINUED | OUTPATIENT
Start: 2021-06-08 | End: 2021-06-09 | Stop reason: HOSPADM

## 2021-06-08 RX ORDER — SODIUM CHLORIDE 0.9 % (FLUSH) 0.9 %
5-40 SYRINGE (ML) INJECTION EVERY 12 HOURS SCHEDULED
Status: DISCONTINUED | OUTPATIENT
Start: 2021-06-08 | End: 2021-06-09 | Stop reason: HOSPADM

## 2021-06-08 RX ORDER — ONDANSETRON 4 MG/1
4 TABLET, ORALLY DISINTEGRATING ORAL EVERY 8 HOURS PRN
Status: DISCONTINUED | OUTPATIENT
Start: 2021-06-08 | End: 2021-06-09 | Stop reason: HOSPADM

## 2021-06-08 RX ORDER — SODIUM CHLORIDE 9 MG/ML
25 INJECTION, SOLUTION INTRAVENOUS PRN
Status: DISCONTINUED | OUTPATIENT
Start: 2021-06-08 | End: 2021-06-09 | Stop reason: HOSPADM

## 2021-06-08 RX ORDER — 0.9 % SODIUM CHLORIDE 0.9 %
1000 INTRAVENOUS SOLUTION INTRAVENOUS ONCE
Status: COMPLETED | OUTPATIENT
Start: 2021-06-08 | End: 2021-06-08

## 2021-06-08 RX ADMIN — SODIUM CHLORIDE 1000 ML: 4.5 INJECTION, SOLUTION INTRAVENOUS at 18:24

## 2021-06-08 RX ADMIN — SODIUM CHLORIDE 1000 ML: 9 INJECTION, SOLUTION INTRAVENOUS at 08:10

## 2021-06-08 RX ADMIN — METOPROLOL TARTRATE 25 MG: 25 TABLET, FILM COATED ORAL at 23:35

## 2021-06-08 RX ADMIN — MORPHINE SULFATE 4 MG: 4 INJECTION, SOLUTION INTRAMUSCULAR; INTRAVENOUS at 15:01

## 2021-06-08 RX ADMIN — ATORVASTATIN CALCIUM 40 MG: 40 TABLET, FILM COATED ORAL at 23:35

## 2021-06-08 RX ADMIN — IOPAMIDOL 75 ML: 755 INJECTION, SOLUTION INTRAVENOUS at 13:31

## 2021-06-08 RX ADMIN — HYDROCODONE BITARTRATE AND ACETAMINOPHEN 1 TABLET: 7.5; 325 TABLET ORAL at 18:27

## 2021-06-08 RX ADMIN — INSULIN LISPRO 5 UNITS: 100 INJECTION, SOLUTION INTRAVENOUS; SUBCUTANEOUS at 18:22

## 2021-06-08 RX ADMIN — INSULIN LISPRO 3 UNITS: 100 INJECTION, SOLUTION INTRAVENOUS; SUBCUTANEOUS at 20:45

## 2021-06-08 RX ADMIN — SODIUM CHLORIDE 1000 ML: 4.5 INJECTION, SOLUTION INTRAVENOUS at 23:27

## 2021-06-08 RX ADMIN — INSULIN GLARGINE 25 UNITS: 100 INJECTION, SOLUTION SUBCUTANEOUS at 23:35

## 2021-06-08 RX ADMIN — ASPIRIN 324 MG: 81 TABLET, CHEWABLE ORAL at 14:15

## 2021-06-08 ASSESSMENT — ENCOUNTER SYMPTOMS
SINUS PRESSURE: 0
COUGH: 0
BACK PAIN: 0
WHEEZING: 0
ABDOMINAL PAIN: 0
DIARRHEA: 0
NAUSEA: 0
SORE THROAT: 0
VOMITING: 0
EYE PAIN: 0
EYE DISCHARGE: 0
EYE REDNESS: 0
SHORTNESS OF BREATH: 0

## 2021-06-08 ASSESSMENT — PAIN DESCRIPTION - LOCATION
LOCATION: BACK
LOCATION: BACK

## 2021-06-08 ASSESSMENT — PAIN SCALES - GENERAL
PAINLEVEL_OUTOF10: 4
PAINLEVEL_OUTOF10: 9
PAINLEVEL_OUTOF10: 6
PAINLEVEL_OUTOF10: 9
PAINLEVEL_OUTOF10: 9

## 2021-06-08 ASSESSMENT — PAIN DESCRIPTION - DIRECTION
RADIATING_TOWARDS: LEFT LEG
RADIATING_TOWARDS: LEFT LEG

## 2021-06-08 ASSESSMENT — PAIN DESCRIPTION - PAIN TYPE: TYPE: ACUTE PAIN

## 2021-06-08 NOTE — H&P
CONTRAST    Result Date: 6/8/2021  EXAMINATION: CT OF THE LUMBAR SPINE WITHOUT CONTRAST  6/8/2021 TECHNIQUE: CT of the lumbar spine was performed without the administration of intravenous contrast. Multiplanar reformatted images are provided for review. Dose modulation, iterative reconstruction, and/or weight based adjustment of the mA/kV was utilized to reduce the radiation dose to as low as reasonably achievable. COMPARISON: None HISTORY: ORDERING SYSTEM PROVIDED HISTORY: l lumbar pain, wit radiculopathy, r/o fx TECHNOLOGIST PROVIDED HISTORY: Reason for exam:->l lumbar pain, wit radiculopathy, r/o fx Decision Support Exception - unselect if not a suspected or confirmed emergency medical condition->Emergency Medical Condition (MA) FINDINGS: BONES/ALIGNMENT: There is normal alignment of the spine. The vertebral body heights are maintained. No osseous destructive lesion is seen. There is osseous bridging of the ligamentous portion of the sacroiliac joints. No bony erosions are seen along the margins of the sacroiliac joints. DEGENERATIVE CHANGES: L1-2: Unremarkable. L2-3: Unremarkable. L3-4: Minimal disc bulge. No significant central canal or lateral recess stenosis. Mild neural foraminal stenoses. L4-5: Small disc bulge. Mild facet and ligamentous hypertrophy. Mild central canal stenosis. Moderate lateral recess and severe neural foraminal stenoses. L5-S1: Small disc bulge. Mild facet and ligamentous hypertrophy. No central canal stenosis. Moderate right and mild left lateral recess stenoses. Severe neural foraminal stenoses. SOFT TISSUES/RETROPERITONEUM: No paraspinal mass is seen. 1.  No fracture or joint dislocation is seen. 2. Mild central canal stenosis at L4-5. 3.  Multilevel neural foraminal stenoses, worst (severe) at L5-S1.      CTA PULMONARY W CONTRAST    Result Date: 6/8/2021  EXAMINATION: CTA OF THE CHEST 6/8/2021 1:32 pm TECHNIQUE: CTA of the chest was performed after the administration of intravenous contrast.  Multiplanar reformatted images are provided for review. MIP images are provided for review. Dose modulation, iterative reconstruction, and/or weight based adjustment of the mA/kV was utilized to reduce the radiation dose to as low as reasonably achievable. COMPARISON: None. HISTORY: ORDERING SYSTEM PROVIDED HISTORY: r/o pe TECHNOLOGIST PROVIDED HISTORY: Reason for exam:->r/o pe Decision Support Exception - unselect if not a suspected or confirmed emergency medical condition->Emergency Medical Condition (MA) FINDINGS: Pulmonary Arteries: Pulmonary arteries are adequately opacified for evaluation. No evidence of intraluminal filling defect to suggest pulmonary embolism. Main pulmonary artery is normal in caliber. Mediastinum: No evidence of mediastinal lymphadenopathy. The heart and pericardium demonstrate no acute abnormality. There is no acute abnormality of the thoracic aorta. Lungs/pleura: Mosaic attenuation in the bilateral lower lobes, likely due to small airway disease. No pulmonary consolidation, mass, or suspicious nodule. No pleural effusion. No pneumothorax. Upper Abdomen: Limited images of the upper abdomen are unremarkable. The left kidney is not identified in the renal fossa. Soft Tissues/Bones: Mild multilevel thoracic spondylosis. No evidence of pulmonary embolism. Mosaic attenuation in the bilateral lower lobes, likely due to small airway disease. EKG: ST abnormalities involving inferior wall. ASSESSMENT:      Active Problems:    Dehydration  Resolved Problems:    * No resolved hospital problems. *      PLAN:    1. Abnormal troponin, abnormal EKG. Needs to watch on telemetry. ,  Will involve cardiology. 2. Known diabetes, not well controlled, out of medications recently, A1c. As per patient he has been on insulin at home. 3. Hypertension, continue home medications. 4. Creatinine 1.6, evidence of volume depletion, IV fluids for now.   5. Back pain which made him come to the ER to begin with, as needed pain control, will ask orthopedics to see the patient, likely will need physical therapy  6. Patient with family history of colon cancer, patient never had 1, will consult GI so that this can be scheduled soon in outpatient setting. 7. DVT prophylaxis, GI prophylaxis, a.m. labs    Code Status: Full  DVT prophylaxis: Lovenox      Electronically signed by Kamron Kessler MD on 6/8/2021 at 4:55 PM      NOTE: This report was transcribed using voice recognition software. Every effort was made to ensure accuracy; however, inadvertent computerized transcription errors may be present.

## 2021-06-08 NOTE — ED PROVIDER NOTES
Patient is a 81-year-old male presenting to emergency department for left hip pain rating on his left leg, started 2 weeks ago after he was doing yard work. 9 out of 10 in severity, was sudden onset, been persistent, nothing makes it better, worsens with ambulation and palpation. He was found to be hypertensive and tachycardic, says he has been off his medications for a while for his high blood pressure and diabetes, he states he has not been able to get any of these medications due to insurance. Review of Systems   Constitutional: Negative for chills and fever. HENT: Negative for ear pain, sinus pressure and sore throat. Eyes: Negative for pain, discharge and redness. Respiratory: Negative for cough, shortness of breath and wheezing. Cardiovascular: Negative for chest pain. Gastrointestinal: Negative for abdominal pain, diarrhea, nausea and vomiting. Endocrine: Negative for polydipsia and polyuria. Genitourinary: Negative for dysuria and frequency. Musculoskeletal: Positive for arthralgias (Left hip pain radiating down his leg). Negative for back pain. Skin: Negative for rash and wound. Neurological: Negative for weakness and headaches. Hematological: Negative for adenopathy. All other systems reviewed and are negative. Physical Exam  Vitals and nursing note reviewed. Constitutional:       Appearance: He is well-developed. HENT:      Head: Normocephalic and atraumatic. Eyes:      Conjunctiva/sclera: Conjunctivae normal.   Cardiovascular:      Rate and Rhythm: Regular rhythm. Tachycardia present. Heart sounds: Normal heart sounds. No murmur heard. Pulmonary:      Effort: Pulmonary effort is normal. No respiratory distress. Breath sounds: Normal breath sounds. No wheezing or rales. Abdominal:      General: Bowel sounds are normal.      Palpations: Abdomen is soft. Tenderness: There is no abdominal tenderness. There is no guarding or rebound. Musculoskeletal:         General: Tenderness present. No deformity. Cervical back: Normal range of motion and neck supple. Skin:     General: Skin is warm and dry. Neurological:      Mental Status: He is alert and oriented to person, place, and time. Cranial Nerves: No cranial nerve deficit. Coordination: Coordination normal.          Procedures     MDM     ED Course as of Jun 08 1551 Tue Jun 08, 2021   9734 Heart rate improving with fluids. Blood pressures improved. [JG]   0856 EKG: This EKG is signed by emergency department physician. Rate: 112  Rhythm: Sinus  Interpretation: Septal infarct, nonspecific ST changes  Comparison: stable as compared to patient's most recent EKG         [JG]   1028 Patient does not appear to be DKA, heart rate improved with fluids, does not appear of an LIZ, it is within his normal kidney function. Will delta troponin patient. Consulted social work as patient is insulin-dependent diabetic, and did not get any of his medications due to insurance. [JG]   1321 She is D-dimer elevated, will obtain CTA to rule out PE    [JG]   1402 No PE noted on CTA. Will admit patient    [JG]   [phone] a call to Dr. Nahomi Ramos, he said he would call me back. [JG]      ED Course User Index  [JG] Jacki Munoz MD    Patient does not appear to be DKA, heart rate improved with fluids, does not appear of an LIZ, it is within his normal kidney function. Will delta troponin patient. Consulted social work as patient is insulin-dependent diabetic, and did not get any of his medications due to insurance. ED Course as of Jun 08 1551 Tue Jun 08, 2021   0854 Heart rate improving with fluids. Blood pressures improved. [JG]   0856 EKG: This EKG is signed by emergency department physician.     Rate: 112  Rhythm: Sinus  Interpretation: Septal infarct, nonspecific ST changes  Comparison: stable as compared to patient's most recent EKG         [JG]   1028 Patient does not appear to be DKA, heart rate improved with fluids, does not appear of an LIZ, it is within his normal kidney function. Will delta troponin patient. Consulted social work as patient is insulin-dependent diabetic, and did not get any of his medications due to insurance. [JG]   1321 She is D-dimer elevated, will obtain CTA to rule out PE    [JG]   1402 No PE noted on CTA. Will admit patient    [JG]   [phone] a call to Dr. Kaycee Page, he said he would call me back. [JG]      ED Course User Index  [JG] Janine Bhakta MD       --------------------------------------------- PAST HISTORY ---------------------------------------------  Past Medical History:  has a past medical history of Diabetes mellitus ([address] Hyperlipidemia, and Hypertension. Past Surgical History:  has no past surgical history on file. Social History:  reports that he has quit smoking. He has never used smokeless tobacco. He reports that he does not drink alcohol and does not use drugs. Family History: family history is not on file. The patients home medications have been reviewed. Allergies: Patient has no known allergies.     -------------------------------------------------- RESULTS -------------------------------------------------    LABS:  Results for orders placed or performed during the hospital encounter of 06/08/21   CBC Auto Differential   Result Value Ref Range    WBC 9.0 4.5 - 11.5 E9/L    RBC 5.58 3.80 - 5.80 E12/L    Hemoglobin 14.9 12.5 - 16.5 g/dL    Hematocrit 45.7 37.0 - 54.0 %    MCV 81.9 80.0 - 99.9 fL    MCH 26.7 26.0 - 35.0 pg    MCHC 32.6 32.0 - 34.5 %    RDW 13.5 11.5 - 15.0 fL    Platelets 182 380 - 746 E9/L    MPV 9.8 7.0 - 12.0 fL    Neutrophils % 64.2 43.0 - 80.0 %    Immature Granulocytes % 0.7 0.0 - 5.0 %    Lymphocytes % 24.9 20.0 - 42.0 %    Monocytes % 9.2 2.0 - 12.0 %    Eosinophils % 0.6 0.0 - 6.0 %    Basophils % 0.4 0.0 - 2.0 %    Neutrophils Absolute 5.76 1.80 - 7.30 E9/L    Immature Granulocytes # 0.06 E9/L    Lymphocytes Absolute 2.23 1.50 - 4.00 E9/L    Monocytes Absolute 0.82 0.10 - 0.95 E9/L    Eosinophils Absolute 0.05 0.05 - 0.50 E9/L    Basophils Absolute 0.04 0.00 - 0.20 O5/K   Basic Metabolic Panel w/ Reflex to MG   Result Value Ref Range    Sodium 129 (L) 132 - 146 mmol/L    Potassium reflex Magnesium 5.0 3.5 - 5.0 mmol/L    Chloride 92 (L) 98 - 107 mmol/L    CO2 25 22 - 29 mmol/L    Anion Gap 12 7 - 16 mmol/L    Glucose 486 (H) 74 - 99 mg/dL    BUN 29 (H) 6 - 20 mg/dL    CREATININE 1.6 (H) 0.7 - 1.2 mg/dL    GFR Non-African American 55 >=60 mL/min/1.73    GFR African American 55     Calcium 10.2 8.6 - 10.2 mg/dL   Magnesium   Result Value Ref Range    Magnesium 2.2 1.6 - 2.6 mg/dL   Beta-Hydroxybutyrate   Result Value Ref Range    Beta-Hydroxybutyrate 0.14 0.02 - 0.27 mmol/L   PH, VENOUS   Result Value Ref Range    pH, Darwin 7.34 (L) 7.35 - 7.45   Urinalysis, reflex to microscopic   Result Value Ref Range    Color, UA Yellow Straw/Yellow    Clarity, UA Clear Clear    Glucose, Ur >=1000 (A) Negative mg/dL    Bilirubin Urine Negative Negative    Ketones, Urine Negative Negative mg/dL    Specific Gravity, UA 1.020 1.005 - 1.030    Blood, Urine TRACE-INTACT Negative    pH, UA 5.5 5.0 - 9.0    Protein, UA 30 (A) Negative mg/dL    Urobilinogen, Urine 0.2 <2.0 E.U./dL    Nitrite, Urine Negative Negative    Leukocyte Esterase, Urine Negative Negative   Lactic Acid, Plasma   Result Value Ref Range    Lactic Acid 1.6 0.5 - 2.2 mmol/L   Troponin   Result Value Ref Range    Troponin, High Sensitivity 28 (H) 0 - 11 ng/L   Troponin   Result Value Ref Range    Troponin, High Sensitivity 23 (H) 0 - 11 ng/L   Microscopic Urinalysis   Result Value Ref Range    WBC, UA NONE 0 - 5 /HPF    RBC, UA 0-1 0 - 2 /HPF    Bacteria, UA NONE SEEN None Seen /HPF   D-Dimer, Quantitative   Result Value Ref Range    D-Dimer, Quant 453 ng/mL DDU   CK   Result Value Ref Range    Total  20 - 200 U/L   POCT Glucose   Result Value Ref Range    Meter Glucose 337 (H) 74 - 99 mg/dL   EKG 12 Lead   Result Value Ref Range    Ventricular Rate 112 BPM    Atrial Rate 112 BPM    P-R Interval 152 ms    QRS Duration 86 ms    Q-T Interval 330 ms    QTc Calculation (Bazett) 450 ms    P Axis 48 degrees    R Axis 71 degrees    T Axis 32 degrees       RADIOLOGY:  CTA PULMONARY W CONTRAST   Final Result   No evidence of pulmonary embolism. Mosaic attenuation in the bilateral lower lobes, likely due to small airway   disease. CT LUMBAR SPINE WO CONTRAST   Final Result   1. No fracture or joint dislocation is seen. 2. Mild central canal stenosis at L4-5.   3.  Multilevel neural foraminal stenoses, worst (severe) at L5-S1.               ------------------------- NURSING NOTES AND VITALS REVIEWED ---------------------------  Date / Time Roomed:  6/8/2021  7:39 AM  ED Bed Assignment:  26/26    The nursing notes within the ED encounter and vital signs as below have been reviewed. Patient Vitals for the past 24 hrs:   BP Temp Temp src Pulse Resp SpO2 Height Weight   06/08/21 1454 (!) 153/100 -- -- -- -- 97 % -- --   06/08/21 1036 (!) 163/100 -- -- 106 -- -- -- --   06/08/21 0852 (!) 125/112 -- -- 108 -- 98 % -- --   06/08/21 0744 (!) 142/100 98.2 °F (36.8 °C) Oral 122 16 99 % -- --   06/08/21 0719 (!) 230/98 98.2 °F (36.8 °C) -- 130 16 97 % 5' 8\" (1.727 m) 197 lb (89.4 kg)       Oxygen Saturation Interpretation: Normal    ------------------------------------------ PROGRESS NOTES ------------------------------------------      Counseling:  I have spoken with the patient and discussed todays results, in addition to providing specific details for the plan of care and counseling regarding the diagnosis and prognosis. Their questions are answered at this time and they are agreeable with the plan of admission.    --------------------------------- ADDITIONAL PROVIDER NOTES ---------------------------------  Consultations:  Time: 1418.  Spoke with  Hric.  Discussed case. They will admit the patient. This patient's ED course included: a personal history and physicial examination, re-evaluation prior to disposition, multiple bedside re-evaluations, IV medications, cardiac monitoring and continuous pulse oximetry    This patient has remained hemodynamically stable during their ED course. Diagnosis:  1. Strain of lumbar region, initial encounter    2. Elevated troponin    3. Dehydration    4. Hyperglycemia        Disposition:  Patient's disposition: Admit to telemetry  Patient's condition is stable.                Alcon Little MD  Resident  06/08/21 1387

## 2021-06-08 NOTE — CARE COORDINATION
Social Work/Transition of Care:    SW received a consult from ED PCP due to Pt not being able to afford his  medications. SW met with pt at bedside introduced self and role. Pt is employed full-time but is unable to afford his prescriptions. Pt reports he has been employed by his company for 6 years and they due  offer insurance but he is unable to afford it. Pt reports he is single and has a dependent 5 y/o old dtr. SW called for a Medicaid Screening, Left message. Pt to be admitted, Assigned SW/HASMUKH to follow.     Electronically signed by ZORAIDA Graves on 0/1/37 at 5:52 PM EDT

## 2021-06-09 VITALS
DIASTOLIC BLOOD PRESSURE: 76 MMHG | RESPIRATION RATE: 18 BRPM | WEIGHT: 188.2 LBS | SYSTOLIC BLOOD PRESSURE: 119 MMHG | HEIGHT: 68 IN | HEART RATE: 102 BPM | BODY MASS INDEX: 28.52 KG/M2 | OXYGEN SATURATION: 100 % | TEMPERATURE: 98.4 F

## 2021-06-09 LAB
ALBUMIN SERPL-MCNC: 4.3 G/DL (ref 3.5–5.2)
ALP BLD-CCNC: 102 U/L (ref 40–129)
ALT SERPL-CCNC: 18 U/L (ref 0–40)
AMPHETAMINE SCREEN, URINE: NOT DETECTED
ANION GAP SERPL CALCULATED.3IONS-SCNC: 10 MMOL/L (ref 7–16)
AST SERPL-CCNC: 18 U/L (ref 0–39)
BARBITURATE SCREEN URINE: NOT DETECTED
BENZODIAZEPINE SCREEN, URINE: NOT DETECTED
BILIRUB SERPL-MCNC: 0.8 MG/DL (ref 0–1.2)
BILIRUBIN DIRECT: <0.2 MG/DL (ref 0–0.3)
BILIRUBIN, INDIRECT: NORMAL MG/DL (ref 0–1)
BUN BLDV-MCNC: 15 MG/DL (ref 6–20)
CALCIUM SERPL-MCNC: 9.9 MG/DL (ref 8.6–10.2)
CANNABINOID SCREEN URINE: NOT DETECTED
CHLORIDE BLD-SCNC: 96 MMOL/L (ref 98–107)
CHOLESTEROL, TOTAL: 201 MG/DL (ref 0–199)
CO2: 27 MMOL/L (ref 22–29)
COCAINE METABOLITE SCREEN URINE: NOT DETECTED
CREAT SERPL-MCNC: 1.1 MG/DL (ref 0.7–1.2)
EKG ATRIAL RATE: 96 BPM
EKG P AXIS: 48 DEGREES
EKG P-R INTERVAL: 150 MS
EKG Q-T INTERVAL: 352 MS
EKG QRS DURATION: 86 MS
EKG QTC CALCULATION (BAZETT): 444 MS
EKG R AXIS: 71 DEGREES
EKG T AXIS: 29 DEGREES
EKG VENTRICULAR RATE: 96 BPM
FENTANYL SCREEN, URINE: NOT DETECTED
GFR AFRICAN AMERICAN: >60
GFR NON-AFRICAN AMERICAN: >60 ML/MIN/1.73
GLUCOSE BLD-MCNC: 205 MG/DL (ref 74–99)
HBA1C MFR BLD: 10.8 % (ref 4–5.6)
HCT VFR BLD CALC: 42.9 % (ref 37–54)
HDLC SERPL-MCNC: 43 MG/DL
HEMOGLOBIN: 13.9 G/DL (ref 12.5–16.5)
LDL CHOLESTEROL CALCULATED: 125 MG/DL (ref 0–99)
Lab: ABNORMAL
MCH RBC QN AUTO: 26.7 PG (ref 26–35)
MCHC RBC AUTO-ENTMCNC: 32.4 % (ref 32–34.5)
MCV RBC AUTO: 82.3 FL (ref 80–99.9)
METER GLUCOSE: 157 MG/DL (ref 74–99)
METER GLUCOSE: 251 MG/DL (ref 74–99)
METER GLUCOSE: 255 MG/DL (ref 74–99)
METHADONE SCREEN, URINE: NOT DETECTED
OPIATE SCREEN URINE: POSITIVE
OXYCODONE URINE: NOT DETECTED
PDW BLD-RTO: 13.6 FL (ref 11.5–15)
PHENCYCLIDINE SCREEN URINE: NOT DETECTED
PLATELET # BLD: 253 E9/L (ref 130–450)
PMV BLD AUTO: 9.9 FL (ref 7–12)
POTASSIUM REFLEX MAGNESIUM: 4.2 MMOL/L (ref 3.5–5)
RBC # BLD: 5.21 E12/L (ref 3.8–5.8)
SODIUM BLD-SCNC: 133 MMOL/L (ref 132–146)
T4 FREE: 1.86 NG/DL (ref 0.93–1.7)
TOTAL PROTEIN: 8.3 G/DL (ref 6.4–8.3)
TRIGL SERPL-MCNC: 166 MG/DL (ref 0–149)
TROPONIN, HIGH SENSITIVITY: 26 NG/L (ref 0–11)
TSH SERPL DL<=0.05 MIU/L-ACNC: 2.23 UIU/ML (ref 0.27–4.2)
VLDLC SERPL CALC-MCNC: 33 MG/DL
WBC # BLD: 10.4 E9/L (ref 4.5–11.5)

## 2021-06-09 PROCEDURE — 99253 IP/OBS CNSLTJ NEW/EST LOW 45: CPT | Performed by: INTERNAL MEDICINE

## 2021-06-09 PROCEDURE — 80076 HEPATIC FUNCTION PANEL: CPT

## 2021-06-09 PROCEDURE — 6360000002 HC RX W HCPCS: Performed by: INTERNAL MEDICINE

## 2021-06-09 PROCEDURE — 99239 HOSP IP/OBS DSCHRG MGMT >30: CPT | Performed by: INTERNAL MEDICINE

## 2021-06-09 PROCEDURE — 85027 COMPLETE CBC AUTOMATED: CPT

## 2021-06-09 PROCEDURE — 80061 LIPID PANEL: CPT

## 2021-06-09 PROCEDURE — 82962 GLUCOSE BLOOD TEST: CPT

## 2021-06-09 PROCEDURE — 80048 BASIC METABOLIC PNL TOTAL CA: CPT

## 2021-06-09 PROCEDURE — 84439 ASSAY OF FREE THYROXINE: CPT

## 2021-06-09 PROCEDURE — 6370000000 HC RX 637 (ALT 250 FOR IP): Performed by: INTERNAL MEDICINE

## 2021-06-09 PROCEDURE — 80307 DRUG TEST PRSMV CHEM ANLYZR: CPT

## 2021-06-09 PROCEDURE — 93005 ELECTROCARDIOGRAM TRACING: CPT | Performed by: NURSE PRACTITIONER

## 2021-06-09 PROCEDURE — 84484 ASSAY OF TROPONIN QUANT: CPT

## 2021-06-09 PROCEDURE — 84443 ASSAY THYROID STIM HORMONE: CPT

## 2021-06-09 PROCEDURE — 93010 ELECTROCARDIOGRAM REPORT: CPT | Performed by: INTERNAL MEDICINE

## 2021-06-09 PROCEDURE — APPSS60 APP SPLIT SHARED TIME 46-60 MINUTES: Performed by: NURSE PRACTITIONER

## 2021-06-09 PROCEDURE — 36415 COLL VENOUS BLD VENIPUNCTURE: CPT

## 2021-06-09 PROCEDURE — 83036 HEMOGLOBIN GLYCOSYLATED A1C: CPT

## 2021-06-09 RX ORDER — HYDROCODONE BITARTRATE AND ACETAMINOPHEN 7.5; 325 MG/1; MG/1
1 TABLET ORAL EVERY 6 HOURS PRN
Qty: 20 TABLET | Refills: 0 | Status: SHIPPED | OUTPATIENT
Start: 2021-06-09 | End: 2021-06-14

## 2021-06-09 RX ORDER — ATORVASTATIN CALCIUM 10 MG/1
10 TABLET, FILM COATED ORAL DAILY
Qty: 90 TABLET | Refills: 1 | Status: SHIPPED | OUTPATIENT
Start: 2021-06-09

## 2021-06-09 RX ORDER — IBUPROFEN 600 MG/1
600 TABLET ORAL 3 TIMES DAILY
Qty: 9 TABLET | Refills: 0 | Status: SHIPPED | OUTPATIENT
Start: 2021-06-09 | End: 2021-06-12

## 2021-06-09 RX ORDER — MORPHINE SULFATE 2 MG/ML
2 INJECTION, SOLUTION INTRAMUSCULAR; INTRAVENOUS
Status: DISCONTINUED | OUTPATIENT
Start: 2021-06-09 | End: 2021-06-09 | Stop reason: HOSPADM

## 2021-06-09 RX ADMIN — INSULIN LISPRO 3 UNITS: 100 INJECTION, SOLUTION INTRAVENOUS; SUBCUTANEOUS at 17:00

## 2021-06-09 RX ADMIN — INSULIN LISPRO 1 UNITS: 100 INJECTION, SOLUTION INTRAVENOUS; SUBCUTANEOUS at 06:23

## 2021-06-09 RX ADMIN — HYDROCODONE BITARTRATE AND ACETAMINOPHEN 1 TABLET: 7.5; 325 TABLET ORAL at 08:58

## 2021-06-09 RX ADMIN — ASPIRIN 81 MG: 81 TABLET, CHEWABLE ORAL at 08:57

## 2021-06-09 RX ADMIN — INSULIN LISPRO 3 UNITS: 100 INJECTION, SOLUTION INTRAVENOUS; SUBCUTANEOUS at 12:28

## 2021-06-09 RX ADMIN — METOPROLOL TARTRATE 25 MG: 25 TABLET, FILM COATED ORAL at 08:57

## 2021-06-09 RX ADMIN — MORPHINE SULFATE 2 MG: 2 INJECTION, SOLUTION INTRAMUSCULAR; INTRAVENOUS at 12:27

## 2021-06-09 ASSESSMENT — PAIN SCALES - GENERAL
PAINLEVEL_OUTOF10: 9
PAINLEVEL_OUTOF10: 6
PAINLEVEL_OUTOF10: 7
PAINLEVEL_OUTOF10: 6

## 2021-06-09 NOTE — PROGRESS NOTES
Edilia Schirmer, Dr Wild Jiménez notified via the office,  Dr Qiana Marcelo notified of consult via med dent,  Dr Charise Aase, [facility] cardiology, notified via secure text and  Dr Lacho Tai notified of consult via med dent.  Smitha paez

## 2021-06-09 NOTE — DISCHARGE SUMMARY
[facility] ([place], [place]) EMERGENCY SERVICE Physician Discharge Summary       [address] [place] [address] [id number] [phone]    In 1 week        Activity level: As tolerated    Diet: ADULT DIET; Regular; Low Fat/Low Chol/High Fiber/2 gm Na    Labs:    Dispo: Lumbar strain    Condition at discharge: Stable    Continue supplemental oxygen via nasal canula @ 2 LPM round-the-clock. Continue CPAP / BiPAP during sleep as prior to admission. Patient ID:  Estuardo Herring  [id number]  50 y.o.  1965    Admit date: 6/8/2021    Discharge date and time:  6/9/2021  5:41 PM    Admission Diagnoses: Active Problems:    Dehydration    Strain of lumbar region  Resolved Problems:    * No resolved hospital problems. *      Discharge Diagnoses: Active Problems:    Dehydration    Strain of lumbar region  Resolved Problems:    * No resolved hospital problems. *      Consults:  IP CONSULT TO SOCIAL WORK  IP CONSULT TO CARDIOLOGY  IP CONSULT TO ORTHOPEDIC SURGERY  IP CONSULT TO GI  IP CONSULT TO ENDOCRINOLOGY  IP CONSULT TO SOCIAL WORK    Procedures: CT    Hospital Course: Patient was admitted with Dehydration [E86.0]. Patient  Admitted in the evening of 8, came with back pain, patient with diabetes, hypertension, family history of colon cancer, CROW, concern over possible ACS, seen by cardiology, no evidence of acute cardiovascular issue, seen by orthopedics, recommends MRI however patient wanted to hold off for now. Will see patient in office in 1 week. Patient awake, alert, comfortable, feels ready to go home. Will change pending consultation with endocrinology as well as gastroenterology to outpatient consultations. Will continue discharging with pain control, patient was given 5 days worth of pain control, also asked to use 3 days of NSAIDs around-the-clock.     Discharge Exam:  Vitals:    06/09/21 0356 06/09/21 0615 06/09/21 0845 06/09/21 1423   BP:   (!) 176/83 119/76 Pulse:   106 102   Resp: 16  18 18   Temp:   98.6 °F (37 °C) 98.4 °F (36.9 °C)   TempSrc:   Oral Oral   SpO2: 100%  99% 100%   Weight:  188 lb 3.2 oz (85.4 kg)     Height:           General Appearance: alert and oriented to person, place and time, well-developed and well-nourished, in no acute distress  Skin: warm and dry, no rash or erythema  Head: normocephalic and atraumatic  Eyes: pupils equal, round, and reactive to light, extraocular eye movements intact, conjunctivae normal  ENT: tympanic membrane, external ear and ear canal normal bilaterally, oropharynx clear and moist with normal mucous membranes  Neck: neck supple and non tender without mass, no thyromegaly or thyroid nodules, no cervical lymphadenopathy   Pulmonary/Chest: clear to auscultation bilaterally- no wheezes, rales or rhonchi, normal air movement, no respiratory distress  Cardiovascular: normal rate, normal S1 and S2, no gallops, intact distal pulses and no carotid bruits  Abdomen: soft, non-tender, non-distended, normal bowel sounds, no masses or organomegaly  No intake/output data recorded. No intake/output data recorded. LABS:  Recent Labs     06/08/21  0803 06/09/21  0845   * 133   K 5.0 4.2   CL 92* 96*   CO2 25 27   BUN 29* 15   CREATININE 1.6* 1.1   GLUCOSE 486* 205*   CALCIUM 10.2 9.9       Recent Labs     06/08/21  0803 06/09/21  0313   WBC 9.0 10.4   RBC 5.58 5.21   HGB 14.9 13.9   HCT 45.7 42.9   MCV 81.9 82.3   MCH 26.7 26.7   MCHC 32.6 32.4   RDW 13.5 13.6    253   MPV 9.8 9.9       Imaging:   CTA PULMONARY W CONTRAST   Final Result   No evidence of pulmonary embolism. Mosaic attenuation in the bilateral lower lobes, likely due to small airway   disease. CT LUMBAR SPINE WO CONTRAST   Final Result   1. No fracture or joint dislocation is seen. 2. Mild central canal stenosis at L4-5.   3.  Multilevel neural foraminal stenoses, worst (severe) at L5-S1.              Patient Instructions: Medication List      START taking these medications    HYDROcodone-acetaminophen 7.5-325 MG per tablet  Commonly known as: NORCO  Take 1 tablet by mouth every 6 hours as needed for Pain for up to 5 days. ibuprofen 600 MG tablet  Commonly known as: ADVIL;MOTRIN  Take 1 tablet by mouth three times daily for 3 days        CONTINUE taking these medications    atorvastatin 10 MG tablet  Commonly known as: LIPITOR  Take 1 tablet by mouth daily     Levemir 100 UNIT/ML injection vial  Generic drug: insulin detemir     lisinopril-hydroCHLOROthiazide 20-25 MG per tablet  Commonly known as: PRINZIDE;ZESTORETIC  Take 1 tablet by mouth daily           Where to Get Your Medications      These medications were sent to [facility], [address] [place] [address], [address] [place] [address]    Phone: [phone]   · atorvastatin 10 MG tablet  · ibuprofen 600 MG tablet     You can get these medications from any pharmacy    Bring a paper prescription for each of these medications  · HYDROcodone-acetaminophen 7.5-325 MG per tablet           Note that more than 30 minutes was spent in preparing discharge papers, discussing discharge with patient, medication review, etc.    Signed:  Electronically signed by Jacinta Werner MD on 6/9/2021 at 5:41 PM    NOTE: This report was transcribed using voice recognition software. Every effort was made to ensure accuracy; however, inadvertent computerized transcription errors may be present.

## 2021-06-09 NOTE — CONSULTS
Gastroenterology Consult Note   RAMONITA Armenta NP-C with Joel Martinez M.D. Consult Note        Date of Service: 6/9/2021  Reason for Consult: family history of colon cancer, needs colon either inpatient or outpatient  Requesting Physician: Dr. Saturnino Sumner:  Back pain    History Obtained From: patient, EMR    HISTORY OF PRESENT ILLNESS:       Miquel Box is a 50 y.o. male with significant past medical history of HTN, HLD and DM admitted via ED for back pain. Pt reports he had persistent lower back pain radiating down to his left side leading him to ER for evaluation. Stating \"I thought it would go away however it was persistent so I decided to come in to have it checked\". He denies symptoms of abdominal pain, nausea, vomiting, fever, chills, epigastric pain, heartburn, unintentional weight loss, change in bowel habits, hematochezia, hematemesis or melena. Reports bowel movements are typically daily to every other day soft and brown. States that he had colonoscopies that were scheduled previously however he would get to finish the prep however not wanting to proceed with the actual procedure part. He reports family history of prostate versus colon cancer, stating I am not sure which one it was. Denies prior EGDs or colonoscopies. Admission labs glucose 486, sodium 129, chloride 92, BUN 29, creatinine 1.6. Consultation for family history of colon cancer, needs colon either inpatient or outpatient. Currently, pt reports no abdominal pain, nausea, vomiting, change in bowel habits, melena or hematochezia. Tolerating diet. Labs today WNL    Past Medical History:        Diagnosis Date    Diabetes mellitus ([facility] [address]     Hyperlipidemia     Hypertension      Past Surgical History:    History reviewed. No pertinent surgical history.   Current Medications:    Current Facility-Administered Medications: morphine (PF) injection 2 mg, 2 mg, Intravenous, Q3H PRN  insulin glargine (LANTUS) injection vial 25 Units, 25 Units, Subcutaneous, Nightly  sodium chloride flush 0.9 % injection 5-40 mL, 5-40 mL, Intravenous, 2 times per day  sodium chloride flush 0.9 % injection 5-40 mL, 5-40 mL, Intravenous, PRN  0.9 % sodium chloride infusion, 25 mL, Intravenous, PRN  ondansetron (ZOFRAN-ODT) disintegrating tablet 4 mg, 4 mg, Oral, Q8H PRN **OR** ondansetron (ZOFRAN) injection 4 mg, 4 mg, Intravenous, Q6H PRN  acetaminophen (TYLENOL) tablet 650 mg, 650 mg, Oral, Q6H PRN **OR** acetaminophen (TYLENOL) suppository 650 mg, 650 mg, Rectal, Q6H PRN  polyethylene glycol (GLYCOLAX) packet 17 g, 17 g, Oral, Daily PRN  aspirin chewable tablet 81 mg, 81 mg, Oral, Daily  potassium chloride (KLOR-CON M) extended release tablet 40 mEq, 40 mEq, Oral, PRN **OR** potassium bicarb-citric acid (EFFER-K) effervescent tablet 40 mEq, 40 mEq, Oral, PRN **OR** potassium chloride 10 mEq/100 mL IVPB (Peripheral Line), 10 mEq, Intravenous, PRN  atorvastatin (LIPITOR) tablet 40 mg, 40 mg, Oral, Nightly  nitroGLYCERIN (NITROSTAT) SL tablet 0.4 mg, 0.4 mg, Sublingual, Q5 Min PRN  metoprolol tartrate (LOPRESSOR) tablet 25 mg, 25 mg, Oral, BID  enoxaparin (LOVENOX) injection 40 mg, 40 mg, Subcutaneous, Daily  insulin lispro (HUMALOG) injection vial 0-6 Units, 0-6 Units, Subcutaneous, TID WC  insulin lispro (HUMALOG) injection vial 0-3 Units, 0-3 Units, Subcutaneous, Nightly  HYDROcodone-acetaminophen (NORCO) 7.5-325 MG per tablet 1 tablet, 1 tablet, Oral, Q6H PRN    Allergies:  Patient has no known allergies. Social History:    Tobacco:  Pt denies  Alcohol:  Pt reports 4-6 drinks, 1-2 times weekly  Illicit Drugs: Pt denies    Family History: Mother-, DM  Father-, prostate or colon cancer    REVIEW OF SYSTEMS:    Aside from what was mentioned in the PMH and HPI, essentially unremarkable, all others negative.     PHYSICAL EXAM:      Vitals:    /76   Pulse 102   Temp 98.4 °F (36.9 °C) (Oral)   Resp 18   Ht 5' 8\" (1.727 m)   Wt 188 lb 3.2 oz (85.4 kg)   SpO2 100%   BMI 28.62 kg/m²       CONSTITUTIONAL:  awake, alert, cooperative, sitting on edge of bed, no apparent distress, and appears stated age  EYES:  pupils equal, round and reactive to light, sclera anicteric and conjunctiva normal  ENT:  normocephalic, oral pharynx with moist mucous membranes  NECK:  supple   HEMATOLOGIC/LYMPHATICS:  no cervical lymphadenopathy and no supraclavicular lymphadenopathy  LUNGS:  No increased work of breathing, good air exchange, clear to auscultation bilaterally.   CARDIOVASCULAR:  Normal apical impulse, regular rate and rhythm, no murmur noted; 2+ pulses; no edema  ABDOMEN:  normal bowel sounds, soft, non-distended, non-tender, no masses palpated, no hepatosplenomegally  MUSCULOSKELETAL:  full range of motion noted  motor strength is 5 out of 5 all extremities bilaterally  NEUROLOGIC:  Mental Status Exam:  Level of Alertness:   awake  Orientation:   person, place, time  Motor Exam:  Motor exam is symmetrical 5 out of 5 all extremities bilaterally  SKIN:  normal skin color, texture, turgor    DATA:    CBC with Differential:    Lab Results   Component Value Date    WBC 10.4 06/09/2021    RBC 5.21 06/09/2021    HGB 13.9 06/09/2021    HCT 42.9 06/09/2021     06/09/2021    MCV 82.3 06/09/2021    MCH 26.7 06/09/2021    MCHC 32.4 06/09/2021    RDW 13.6 06/09/2021    LYMPHOPCT 24.9 06/08/2021    MONOPCT 9.2 06/08/2021    BASOPCT 0.4 06/08/2021    MONOSABS 0.82 06/08/2021    LYMPHSABS 2.23 06/08/2021    EOSABS 0.05 06/08/2021    BASOSABS 0.04 06/08/2021     CMP:    Lab Results   Component Value Date     06/09/2021    K 4.2 06/09/2021    CL 96 06/09/2021    CO2 27 06/09/2021    BUN 15 06/09/2021    CREATININE 1.1 06/09/2021    GFRAA >60 06/09/2021    LABGLOM >60 06/09/2021    GLUCOSE 205 06/09/2021    PROT 8.3 06/09/2021    LABALBU 4.3 06/09/2021    CALCIUM 9.9 06/09/2021    BILITOT 0.8 06/09/2021    ALKPHOS 102 06/09/2021    AST 18 06/09/2021    ALT 18 06/09/2021     Hepatic Function Panel:    Lab Results   Component Value Date    ALKPHOS 102 06/09/2021    ALT 18 06/09/2021    AST 18 06/09/2021    PROT 8.3 06/09/2021    BILITOT 0.8 06/09/2021    BILIDIR <0.2 06/09/2021    IBILI see below 06/09/2021    LABALBU 4.3 06/09/2021     PT/INR:  No results found for: PROTIME, INR  PTT:  No results found for: APTT, PTT[APTT}  Last 3 Troponin:    Lab Results   Component Value Date    TROPONINI <0.01 05/07/2019    TROPONINI <0.01 05/19/2017       No components found for: CHLPL     Prasanna  Lab Results   Component Value Date    TRIG 166 (H) 06/09/2021    TRIG 200 (H) 05/20/2017   BVL  Lab Results   Component Value Date    HDL 43 06/09/2021    HDL 39 05/20/2017   leal  Lab Results   Component Value Date    LDLCALC 125 (H) 06/09/2021    LDLCALC 132 (H) 05/20/2017   lt Date: 6/8/2021  EXAMINATION: CT OF THE LUMBAR SPINE WITHOUT CONTRAST  6/8/2021 TECHNIQUE: CT of the lumbar spine was performed without the administration of intravenous contrast. Multiplanar reformatted images are provided for review. Dose modulation, iterative reconstruction, and/or weight based adjustment of the mA/kV was utilized to reduce the radiation dose to as low as reasonably achievable. COMPARISON: None HISTORY: ORDERING SYSTEM PROVIDED HISTORY: l lumbar pain, wit radiculopathy, r/o fx TECHNOLOGIST PROVIDED HISTORY: Reason for exam:->l lumbar pain, wit radiculopathy, r/o fx Decision Support Exception - unselect if not a suspected or confirmed emergency medical condition->Emergency Medical Condition (MA) FINDINGS: BONES/ALIGNMENT: There is normal alignment of the spine. The vertebral body heights are maintained. No osseous destructive lesion is seen. There is osseous bridging of the ligamentous portion of the sacroiliac joints. No bony erosions are seen along the margins of the sacroiliac joints. DEGENERATIVE CHANGES: L1-2: Unremarkable. L2-3: Unremarkable. L3-4: Minimal disc bulge.   No significant central canal or lateral recess stenosis. Mild neural foraminal stenoses. L4-5: Small disc bulge. Mild facet and ligamentous hypertrophy. Mild central canal stenosis. Moderate lateral recess and severe neural foraminal stenoses. L5-S1: Small disc bulge. Mild facet and ligamentous hypertrophy. No central canal stenosis. Moderate right and mild left lateral recess stenoses. Severe neural foraminal stenoses. SOFT TISSUES/RETROPERITONEUM: No paraspinal mass is seen. 1.  No fracture or joint dislocation is seen. 2. Mild central canal stenosis at L4-5. 3.  Multilevel neural foraminal stenoses, worst (severe) at L5-S1. CTA PULMONARY W CONTRAST    Result Date: 6/8/2021  EXAMINATION: CTA OF THE CHEST 6/8/2021 1:32 pm TECHNIQUE: CTA of the chest was performed after the administration of intravenous contrast.  Multiplanar reformatted images are provided for review. MIP images are provided for review. Dose modulation, iterative reconstruction, and/or weight based adjustment of the mA/kV was utilized to reduce the radiation dose to as low as reasonably achievable. COMPARISON: None. HISTORY: ORDERING SYSTEM PROVIDED HISTORY: r/o pe TECHNOLOGIST PROVIDED HISTORY: Reason for exam:->r/o pe Decision Support Exception - unselect if not a suspected or confirmed emergency medical condition->Emergency Medical Condition (MA) FINDINGS: Pulmonary Arteries: Pulmonary arteries are adequately opacified for evaluation. No evidence of intraluminal filling defect to suggest pulmonary embolism. Main pulmonary artery is normal in caliber. Mediastinum: No evidence of mediastinal lymphadenopathy. The heart and pericardium demonstrate no acute abnormality. There is no acute abnormality of the thoracic aorta. Lungs/pleura: Mosaic attenuation in the bilateral lower lobes, likely due to small airway disease. No pulmonary consolidation, mass, or suspicious nodule. No pleural effusion. No pneumothorax.  Upper Abdomen: Limited images of the upper abdomen are unremarkable. The left kidney is not identified in the renal fossa. Soft Tissues/Bones: Mild multilevel thoracic spondylosis. No evidence of pulmonary embolism. Mosaic attenuation in the bilateral lower lobes, likely due to small airway disease. IMPRESSION:  · Back pain- defer to PCP  · Unknown family history of colon cancer vs prostate cancer per pt  · DM  · Hyperglycemia  · Abnormal troponins- cardiology consulted    RECOMMENDATIONS:    · Colonoscopy screening to be done as outpatient, patient to call office to arrange, no plans for inpatient endoscopic procedures. · Discharge per PCP, okay from GI POV with outpatient follow-up. Note: This report was completed utilizing computer voice recognition software. Every effort has been made to ensure accuracy, however; inadvertent computerized transcription errors may be present. Thank you very much for your consultation. We will follow closely with you.     Discussed with Dr. Hood Terry developed by Dr. Abhishek Alexander, APRN, NP-C 6/9/2021 5:17 PM for Dr. Mattie Mon

## 2021-06-09 NOTE — CONSULTS
Inpatient Cardiology Consultation      Reason for Consult:  ACS. Elevated troponin    Consulting Physician: Dr Babak Corey    Requesting Physician:  Dr Oliva List    Date of Consultation: 6/9/2021    HISTORY OF PRESENT ILLNESS: 49 yo AAM not previously known to any cardiologist.      Has not taken any medications since 12/2020 (no insurance). PMH: T2DM insulin requiring, HTN, CROW reports compliance with C-pap    Reports injuring his back doing yard work approximately 2 weeks ago L lower back radiation into L buttock and LLE numbness. Adamantly denies CP, SB, dizziness or chest pressure. [facility] 6/8/2021 for Back pain. BP upon arrival 230/98 's O2 saturation 97% on RA and afebrile. Na 129, K+ 5.0, Bun/Cr 29/1.6, troponin 28-->23-->26, , CKMB <1.0, glucose 486, T Chol 201, HDL 43, , triglycerides 166, CBC, D-dimer 453, UA. CTA Pulmonary no PE.     1 liter NSS,  mg, 4 mg IV Morphine, 1/2 NSS at 100/hr    Reports improvement in L lower back pain after Morphine. Please note: past medical records were reviewed per electronic medical record (EMR) - see detailed reports under Past Medical/ Surgical History. Past Medical History:    1. Lifelong non cigarette smoker  2. T2DM insulin requiring  3. HTN  4. HLD  5. CROW reports compliance with C-pap    Past Surgical History:  Denies any surgeries      Medications Prior to admit: Has taken no medications since 12/2020  Prior to Admission medications    Medication Sig Start Date End Date Taking?  Authorizing Provider   insulin detemir (LEVEMIR) 100 UNIT/ML injection vial Inject 32 Units into the skin nightly    Historical Provider, MD   lisinopril-hydrochlorothiazide (PRINZIDE;ZESTORETIC) 20-25 MG per tablet Take 1 tablet by mouth daily 6/19/18   Goran Lopez MD       Current Medications:    Current Facility-Administered Medications: insulin glargine (LANTUS) injection vial 25 Units, 25 Units, Subcutaneous, Nightly  sodium chloride flush 0.9 % injection 5-40 mL, 5-40 mL, Intravenous, 2 times per day  sodium chloride flush 0.9 % injection 5-40 mL, 5-40 mL, Intravenous, PRN  0.9 % sodium chloride infusion, 25 mL, Intravenous, PRN  ondansetron (ZOFRAN-ODT) disintegrating tablet 4 mg, 4 mg, Oral, Q8H PRN **OR** ondansetron (ZOFRAN) injection 4 mg, 4 mg, Intravenous, Q6H PRN  acetaminophen (TYLENOL) tablet 650 mg, 650 mg, Oral, Q6H PRN **OR** acetaminophen (TYLENOL) suppository 650 mg, 650 mg, Rectal, Q6H PRN  polyethylene glycol (GLYCOLAX) packet 17 g, 17 g, Oral, Daily PRN  aspirin chewable tablet 81 mg, 81 mg, Oral, Daily  potassium chloride (KLOR-CON M) extended release tablet 40 mEq, 40 mEq, Oral, PRN **OR** potassium bicarb-citric acid (EFFER-K) effervescent tablet 40 mEq, 40 mEq, Oral, PRN **OR** potassium chloride 10 mEq/100 mL IVPB (Peripheral Line), 10 mEq, Intravenous, PRN  atorvastatin (LIPITOR) tablet 40 mg, 40 mg, Oral, Nightly  nitroGLYCERIN (NITROSTAT) SL tablet 0.4 mg, 0.4 mg, Sublingual, Q5 Min PRN  metoprolol tartrate (LOPRESSOR) tablet 25 mg, 25 mg, Oral, BID  enoxaparin (LOVENOX) injection 40 mg, 40 mg, Subcutaneous, Daily  insulin lispro (HUMALOG) injection vial 0-6 Units, 0-6 Units, Subcutaneous, TID WC  insulin lispro (HUMALOG) injection vial 0-3 Units, 0-3 Units, Subcutaneous, Nightly  0.45 % sodium chloride infusion, 1,000 mL, Intravenous, Continuous  HYDROcodone-acetaminophen (NORCO) 7.5-325 MG per tablet 1 tablet, 1 tablet, Oral, Q6H PRN    Allergies:  NKDA per patient report    Social History:    Tobacco: Never  ETOH: Occasional Beer  Illicit Drugs: remote history of Marijuana use  Caffeine: Occasional pop, Green Tea  Activity: Lives alone in 2 story home. Employed   Code Status: Full Code      Family History: Mother  age 68 from kidney failure. +DM, + tobacco, + CVA., +obesity. No reported CAD/PCI  Father  61 carcinoma. + tobacco. No reported CAD/PCI.    3 Brothers alive no reported CAD DM  2 Sisters alive no reported CAD or DM      REVIEW OF SYSTEMS:     · Constitutional: Denies fatigue, fevers, chills or night sweats  · Eyes: Denies visual changes or drainage  · ENT: Denies headaches or hearing loss. No mouth sores or sore throat. No epistaxis   · Cardiovascular: Denies chest pain, pressure or palpitations. No lower extremity swelling. · Respiratory: Denies REID, cough, orthopnea or PND. No hemoptysis   · Gastrointestinal: Denies hematemesis or anorexia. No hematochezia or melena    · Genitourinary: Denies urgency, dysuria or hematuria. · Musculoskeletal: + L lower back pain radiating to LLE. · Integumentary: Denies rash, hives or pruritis   · Neurological: Denies dizziness, headaches or seizures. No numbness or tingling  · Psychiatric: Denies anxiety or depression. · Endocrine: Denies temperature intolerance. No recent weight change. .  · Hematologic/Lymphatic: Denies abnormal bruising or bleeding. No swollen lymph nodes    PHYSICAL EXAM:   BP (!) 173/106   Pulse 110   Temp 97.7 °F (36.5 °C) (Oral)   Resp 16   Ht 5' 8\" (1.727 m)   Wt 188 lb 3.2 oz (85.4 kg)   SpO2 100%   BMI 28.62 kg/m²   CONST:  Well developed, AAM who appears of stated age. Awake, alert and cooperative. No apparent distress. HEENT:   Head- Normocephalic, atraumatic   Eyes- Conjunctivae pink, anicteric  Throat- Oral mucosa pink and moist  Neck-  No stridor, trachea midline, no jugular venous distention. No carotid bruit. CHEST: Chest symmetrical and non-tender to palpation. No accessory muscle use or intercostal retractions  RESPIRATORY: Lung sounds - clear throughout fields   CARDIOVASCULAR:     Heart Ausculation- Regular rate and rhythm, no murmur. No s3, s4 or rub   PV: No lower extremity edema. No varicosities. Pedal pulses palpable, no clubbing or cyanosis   ABDOMEN: Soft, non-tender to light palpation. Bowel sounds present. No palpable masses no organomegaly; no abdominal bruit  MS: Good muscle strength and tone.  No atrophy or abnormal movements. : Deferred  SKIN: Warm and dry no statis dermatitis or ulcers   NEURO / PSYCH: Oriented to person, place and time. Speech clear and appropriate. Follows all commands. Pleasant affect     DATA:    ECG ST NSSTT wave changes  Tele strips: 's    Diagnostic:    CTA Pulmonary W 6/8/2021: No evidence of pulmonary embolism. Mosaic attenuation in the bilateral lower lobes, likely due to small airway disease. CT Lumbar Spine WO 6/8/2021: No fracture or joint dislocation is seen. Mild central canal stenosis at L4-5. Multilevel neural foraminal stenoses, worst (severe) at L5-S1    Labs:   CBC:   Recent Labs     06/08/21  0803 06/09/21  0313   WBC 9.0 10.4   HGB 14.9 13.9   HCT 45.7 42.9    253     BMP:   Recent Labs     06/08/21  0803   *   K 5.0   CO2 25   BUN 29*   CREATININE 1.6*   LABGLOM 55   CALCIUM 10.2     Mag:   Recent Labs     06/08/21  0803   MG 2.2     HgA1c:   Lab Results   Component Value Date    LABA1C  06/19/2018      Comment:      over 15%, machine says #[id number]     CARDIAC ENZYMES:  Recent Labs     06/08/21  1049 06/09/21  0010   CKTOTAL 102  --    CKMB <1.0  --    TROPHS 23* 26*     FASTING LIPID PANEL:  Lab Results   Component Value Date    CHOL 201 06/09/2021    HDL 43 06/09/2021    LDLCALC 125 06/09/2021    TRIG 166 06/09/2021   A&P per Dr Viraj Akhtar  Electronically signed by Wilma Dejesus. SHEYLA Rubalcava on 6/9/2021 at 6:36 AM     The above documentation has been prepared under my direction and personally reviewed by me in its entirety. I confirm that the note above accurately reflects all work, treatment, procedures, and medical decision making performed by me. The patient's history was independently obtained. The patient was independently examined. Electrocardiogram, prior and present cardiovascular assessment, and laboratory studies were reviewed.     The patient is a 70-year-old black male with no association to [facility] and no known history of structural heart disease. He has a past history of hypertension, diabetes and hyperlipidemia as well as that of obstructive sleep apnea with reported appropriate compliance without of nocturnal CPAP. He otherwise on the basis of financial limitations receives limited medical care and has been on no medical management in spite of his previous documented health issues. He is normally active with functional capabilities in excess of 5 METs working in housekeeping for [employer] with no active cardiovascular symptoms. Within the past several weeks while doing yard work, he noted the onset of left lumbosacral back pain with radicular extension into his gluteal region and left lower extremity. Exclusively on this basis, he presented to the emergency room but subsequent to presentation, underwent further evaluation inclusive of an echocardiogram demonstrating sinus rhythm upon review with the inability to exclude an anteroseptal infarction with no change from previous tracings and mild elevation of high-sensitivity troponin levels in a pattern not suggestive of an acute coronary syndrome with normal CPK and myocardial index. In addition the time of evaluation evidence of acute kidney injury and prerenal azotemia were noted as well as that of mild hyponatremia and hypochloremia in addition to that of suboptimal control of his diabetes. He is remained asymptomatic from a cardiovascular standpoint during hospitalization with a repeat electrocardiogram demonstrating no changes from that of his baseline. At the time of evaluation, his medications and allergies were reviewed as well as that of his past medical history and review of systems as documented. On examination, he appears uncomfortable exclusively from that of his back and radicular discomfort with no additional complaints and remains hemodynamically stable vital signs as documented. Jugular venous pressure appears normal with no identified carotid bruits. Lung fields are clear to auscultation. Cardiac examination still very regular rate and rhythm no gallop rhythm or cardiac murmur. A benign abdominal examination is present no peripheral edema noted. Diagnostic Assessment and Plan: On a clinical basis, the patient presents exclusively with noncardiovascular symptomatology and with present untreated multiple cardiovascular risk factors inclusive of hypertension and diabetes. In the absence of symptoms or electrocardiographic abnormalities from that of his previous tracings as well as the overall review of his cardiac biomarkers, no evidence of an acute cardiovascular issue is noted. On his basis, medical management of his hypertension and diabetes would be advisable and have been discussed extensively with he and his significant other in addition to the needs of optimal medical management of his diabetes with conversion of present atorvastatin to that of high-dose therapy to optimally reduce risk of atherosclerotic development and/or progression. In view of his present resolving acute kidney injury, the avoidance of previously utilized angiotensin-converting enzyme inhibitor and diuretic would be advisable with need of careful monitoring of his renal function and electrolytes to guide appropriate management on an outpatient basis with an angiotensin-converting enzyme inhibitor beneficial to reducing his risk of diabetic nephropathy. No additional cardiovascular assessment is presently indicated and we will further evaluate him should additional cardiovascular difficulties or concerns arise. Thank you for allowing me to participate in your patient's care. Please feel free to contact me if you have any questions or concerns. Angelic Sheikh.  Henrietta Verduzco, [phone] [facility]

## 2021-06-09 NOTE — CONSULTS
chewable tablet 81 mg, 81 mg, Oral, Daily  potassium chloride (KLOR-CON M) extended release tablet 40 mEq, 40 mEq, Oral, PRN **OR** potassium bicarb-citric acid (EFFER-K) effervescent tablet 40 mEq, 40 mEq, Oral, PRN **OR** potassium chloride 10 mEq/100 mL IVPB (Peripheral Line), 10 mEq, Intravenous, PRN  atorvastatin (LIPITOR) tablet 40 mg, 40 mg, Oral, Nightly  nitroGLYCERIN (NITROSTAT) SL tablet 0.4 mg, 0.4 mg, Sublingual, Q5 Min PRN  metoprolol tartrate (LOPRESSOR) tablet 25 mg, 25 mg, Oral, BID  enoxaparin (LOVENOX) injection 40 mg, 40 mg, Subcutaneous, Daily  insulin lispro (HUMALOG) injection vial 0-6 Units, 0-6 Units, Subcutaneous, TID WC  insulin lispro (HUMALOG) injection vial 0-3 Units, 0-3 Units, Subcutaneous, Nightly  0.45 % sodium chloride infusion, 1,000 mL, Intravenous, Continuous  HYDROcodone-acetaminophen (NORCO) 7.5-325 MG per tablet 1 tablet, 1 tablet, Oral, Q6H PRN  Allergies:  Patient has no known allergies. Social History:   TOBACCO:  Former smoker  ETOH:  No alcohol use    Family History:   History reviewed. No pertinent family history.   REVIEW OF SYSTEMS:    CONSTITUTIONAL:  negative  EYES:  negative  HEENT:  negative  RESPIRATORY:  negative  CARDIOVASCULAR:  negative  GASTROINTESTINAL:  negative  MUSCULOSKELETAL:  +Left leg pain posteriorly, mild LBP  NEUROLOGICAL:  Mild numbness L foot  BEHAVIOR/PSYCH:  negative    PHYSICAL EXAM:    VITALS:  BP (!) 176/83   Pulse 106   Temp 98.6 °F (37 °C) (Oral)   Resp 18   Ht 5' 8\" (1.727 m)   Wt 188 lb 3.2 oz (85.4 kg)   SpO2 99%   BMI 28.62 kg/m²   CONSTITUTIONAL:  awake, alert, cooperative, no apparent distress, and appears stated age  EYES:  Lids and lashes normal, pupils equal, round and reactive to light, extra ocular muscles intact, sclera clear, conjunctiva normal  BACK:  No TTP over paravertebral musculasture, no rash or lesion, no pain with ROM flex/ext  LUNGS:  No increased work of breathing, good air exchange, clear to auscultation bilaterally, no crackles or wheezing  CARDIOVASCULAR:  normal apical pulses and no edema  ABDOMEN:  normal bowel sounds, soft, non-distended, non-tender  MUSCULOSKELETAL:  there is no redness, warmth, or swelling of the joints  motor strength is 5/5 BLE, +SLR at 90 deg on the left, neg on the right  NEUROLOGIC:  Awake, alert, oriented to name, place and time. Cranial nerves II-XII are grossly intact. Motor is 5 out of 5 bilaterally. Gait is mildly antalgic, sensation grossly intact to LT  SKIN:  no redness, warmth, or swelling, no rashes and no lesions    DATA:    Lumbar CT scan      1.  No fracture or joint dislocation is seen. 2. Mild central canal stenosis at L4-5. 3.  Multilevel neural foraminal stenoses, worst (severe) at L5-S1. CT reviewed by Dr Wild Jiménez showing no significant central canal stenosis or fx. IMPRESSION/RECOMMENDATIONS:    51 yo male who presented with 2 weeks LLE pain consistent with radicular pain with exam showing mild SLR on the left, no motor weakness. CT showing neuroforaminal narrowing L5-S1, mild stenosis L4-5. MRI would be recommended to evaluate for HNP/nerve compression but patient would like to hold off for now. Normally would recommend steroids for pain, but in setting of uncontrolled DM and off meds recently, not a great option. Discussed conservative treatment including pain control, PT, injections down the road if pain does not improve. Recommend follow up in 1 week in the office. Call if patient would like to proceed with MRI inpatient. Continue current pain control. Reviewed and discussed with Dr. Wild Jiménez.

## 2021-06-09 NOTE — CARE COORDINATION
6/9/2021 Social Work Discharge Planning: This worker met with Pt to discuss  role and transition of care/discharge planning. Pt works full time here but unable to afford his insulin. Pt is single and a dependent 6 jr old daughter. Per Madhu Bentley. Adv., Pt is over income for assist with insulin but she is applying for financial assistance for Pt. No other needs.  Electronically signed by ZORAIDA Mcgowan on 6/9/2021 at 3:04 PM

## 2021-06-13 LAB — URINE CULTURE, ROUTINE: NORMAL

## 2021-07-08 PROBLEM — E86.0 DEHYDRATION: Status: RESOLVED | Noted: 2021-06-08 | Resolved: 2021-07-08
